# Patient Record
Sex: MALE | Race: WHITE | Employment: OTHER | URBAN - METROPOLITAN AREA
[De-identification: names, ages, dates, MRNs, and addresses within clinical notes are randomized per-mention and may not be internally consistent; named-entity substitution may affect disease eponyms.]

---

## 2022-09-10 ENCOUNTER — APPOINTMENT (OUTPATIENT)
Dept: GENERAL RADIOLOGY | Age: 67
DRG: 871 | End: 2022-09-10
Payer: COMMERCIAL

## 2022-09-10 ENCOUNTER — HOSPITAL ENCOUNTER (INPATIENT)
Age: 67
LOS: 6 days | Discharge: NURSING, REHAB OR LONG-TERM CARE | DRG: 871 | End: 2022-09-16
Attending: STUDENT IN AN ORGANIZED HEALTH CARE EDUCATION/TRAINING PROGRAM | Admitting: INTERNAL MEDICINE
Payer: COMMERCIAL

## 2022-09-10 ENCOUNTER — APPOINTMENT (OUTPATIENT)
Dept: CT IMAGING | Age: 67
DRG: 871 | End: 2022-09-10
Payer: COMMERCIAL

## 2022-09-10 DIAGNOSIS — U07.1 COVID: ICD-10-CM

## 2022-09-10 DIAGNOSIS — E83.42 HYPOMAGNESEMIA: ICD-10-CM

## 2022-09-10 DIAGNOSIS — K70.30 ALCOHOLIC CIRRHOSIS OF LIVER WITHOUT ASCITES (HCC): ICD-10-CM

## 2022-09-10 DIAGNOSIS — B96.29 UTI DUE TO EXTENDED-SPECTRUM BETA LACTAMASE (ESBL) PRODUCING ESCHERICHIA COLI: ICD-10-CM

## 2022-09-10 DIAGNOSIS — Z16.12 UTI DUE TO EXTENDED-SPECTRUM BETA LACTAMASE (ESBL) PRODUCING ESCHERICHIA COLI: ICD-10-CM

## 2022-09-10 DIAGNOSIS — Z16.12 INFECTION DUE TO ESBL-PRODUCING ESCHERICHIA COLI: ICD-10-CM

## 2022-09-10 DIAGNOSIS — A41.9 SEPSIS, DUE TO UNSPECIFIED ORGANISM, UNSPECIFIED WHETHER ACUTE ORGAN DYSFUNCTION PRESENT (HCC): Primary | ICD-10-CM

## 2022-09-10 DIAGNOSIS — N39.0 UTI DUE TO EXTENDED-SPECTRUM BETA LACTAMASE (ESBL) PRODUCING ESCHERICHIA COLI: ICD-10-CM

## 2022-09-10 DIAGNOSIS — A49.8 INFECTION DUE TO ESBL-PRODUCING ESCHERICHIA COLI: ICD-10-CM

## 2022-09-10 PROBLEM — R65.21 SEPTIC SHOCK (HCC): Status: ACTIVE | Noted: 2022-09-10

## 2022-09-10 LAB
A/G RATIO: 0.9 (ref 1.1–2.2)
ALBUMIN SERPL-MCNC: 2.9 G/DL (ref 3.4–5)
ALP BLD-CCNC: 310 U/L (ref 40–129)
ALT SERPL-CCNC: 33 U/L (ref 10–40)
ANION GAP SERPL CALCULATED.3IONS-SCNC: 16 MMOL/L (ref 3–16)
AST SERPL-CCNC: 114 U/L (ref 15–37)
BACTERIA: ABNORMAL /HPF
BASE EXCESS VENOUS: -7.1 MMOL/L (ref -2–3)
BASOPHILS ABSOLUTE: 0.1 K/UL (ref 0–0.2)
BASOPHILS RELATIVE PERCENT: 0.7 %
BILIRUB SERPL-MCNC: 9.4 MG/DL (ref 0–1)
BILIRUBIN URINE: ABNORMAL
BLOOD, URINE: ABNORMAL
BUN BLDV-MCNC: 10 MG/DL (ref 7–20)
CALCIUM SERPL-MCNC: 8.5 MG/DL (ref 8.3–10.6)
CARBOXYHEMOGLOBIN: 2 % (ref 0–1.5)
CHLORIDE BLD-SCNC: 98 MMOL/L (ref 99–110)
CLARITY: CLEAR
CO2: 15 MMOL/L (ref 21–32)
COLOR: YELLOW
CREAT SERPL-MCNC: 0.9 MG/DL (ref 0.8–1.3)
EOSINOPHILS ABSOLUTE: 0 K/UL (ref 0–0.6)
EOSINOPHILS RELATIVE PERCENT: 0.2 %
EPITHELIAL CELLS, UA: ABNORMAL /HPF (ref 0–5)
GFR AFRICAN AMERICAN: >60
GFR NON-AFRICAN AMERICAN: >60
GLUCOSE BLD-MCNC: 122 MG/DL (ref 70–99)
GLUCOSE URINE: NEGATIVE MG/DL
HCO3 VENOUS: 16.3 MMOL/L (ref 24–28)
HCT VFR BLD CALC: 30.3 % (ref 40.5–52.5)
HEMOGLOBIN, VEN, REDUCED: 4.9 %
HEMOGLOBIN: 9.8 G/DL (ref 13.5–17.5)
INFLUENZA A: NOT DETECTED
INFLUENZA B: NOT DETECTED
INR BLD: 1.43 (ref 0.87–1.14)
KETONES, URINE: ABNORMAL MG/DL
LACTIC ACID, SEPSIS: 4.1 MMOL/L (ref 0.4–1.9)
LACTIC ACID: 1.6 MMOL/L (ref 0.4–2)
LACTIC ACID: 2.5 MMOL/L (ref 0.4–2)
LACTIC ACID: 4.1 MMOL/L (ref 0.4–2)
LEUKOCYTE ESTERASE, URINE: ABNORMAL
LYMPHOCYTES ABSOLUTE: 0.3 K/UL (ref 1–5.1)
LYMPHOCYTES RELATIVE PERCENT: 4.2 %
MAGNESIUM: 1.5 MG/DL (ref 1.8–2.4)
MCH RBC QN AUTO: 35 PG (ref 26–34)
MCHC RBC AUTO-ENTMCNC: 32.4 G/DL (ref 31–36)
MCV RBC AUTO: 108.1 FL (ref 80–100)
METHEMOGLOBIN VENOUS: 0 % (ref 0–1.5)
MICROSCOPIC EXAMINATION: YES
MONOCYTES ABSOLUTE: 0.2 K/UL (ref 0–1.3)
MONOCYTES RELATIVE PERCENT: 2.6 %
NEUTROPHILS ABSOLUTE: 7 K/UL (ref 1.7–7.7)
NEUTROPHILS RELATIVE PERCENT: 92.3 %
NITRITE, URINE: POSITIVE
O2 SAT, VEN: 95 %
PCO2, VEN: 25.9 MMHG (ref 41–51)
PDW BLD-RTO: 16.3 % (ref 12.4–15.4)
PH UA: 6 (ref 5–8)
PH VENOUS: 7.41 (ref 7.35–7.45)
PLATELET # BLD: 169 K/UL (ref 135–450)
PMV BLD AUTO: 10.9 FL (ref 5–10.5)
PO2, VEN: 70.7 MMHG (ref 25–40)
POTASSIUM SERPL-SCNC: 4.2 MMOL/L (ref 3.5–5.1)
PRO-BNP: 1452 PG/ML (ref 0–124)
PROTEIN UA: 30 MG/DL
PROTHROMBIN TIME: 17.4 SEC (ref 11.7–14.5)
RBC # BLD: 2.8 M/UL (ref 4.2–5.9)
RBC UA: ABNORMAL /HPF (ref 0–4)
SARS-COV-2 RNA, RT PCR: DETECTED
SODIUM BLD-SCNC: 129 MMOL/L (ref 136–145)
SPECIFIC GRAVITY UA: 1.01 (ref 1–1.03)
TCO2 CALC VENOUS: 17 MMOL/L
TOTAL PROTEIN: 6.3 G/DL (ref 6.4–8.2)
TROPONIN: <0.01 NG/ML
URINE REFLEX TO CULTURE: YES
URINE TYPE: ABNORMAL
UROBILINOGEN, URINE: 1 E.U./DL
WBC # BLD: 7.6 K/UL (ref 4–11)
WBC UA: >100 /HPF (ref 0–5)

## 2022-09-10 PROCEDURE — 80053 COMPREHEN METABOLIC PANEL: CPT

## 2022-09-10 PROCEDURE — 6360000002 HC RX W HCPCS: Performed by: INTERNAL MEDICINE

## 2022-09-10 PROCEDURE — 2060000000 HC ICU INTERMEDIATE R&B

## 2022-09-10 PROCEDURE — 71275 CT ANGIOGRAPHY CHEST: CPT

## 2022-09-10 PROCEDURE — 87150 DNA/RNA AMPLIFIED PROBE: CPT

## 2022-09-10 PROCEDURE — 87086 URINE CULTURE/COLONY COUNT: CPT

## 2022-09-10 PROCEDURE — 83880 ASSAY OF NATRIURETIC PEPTIDE: CPT

## 2022-09-10 PROCEDURE — 36415 COLL VENOUS BLD VENIPUNCTURE: CPT

## 2022-09-10 PROCEDURE — 81001 URINALYSIS AUTO W/SCOPE: CPT

## 2022-09-10 PROCEDURE — 6370000000 HC RX 637 (ALT 250 FOR IP)

## 2022-09-10 PROCEDURE — 74174 CTA ABD&PLVS W/CONTRAST: CPT

## 2022-09-10 PROCEDURE — 6360000002 HC RX W HCPCS: Performed by: STUDENT IN AN ORGANIZED HEALTH CARE EDUCATION/TRAINING PROGRAM

## 2022-09-10 PROCEDURE — 96375 TX/PRO/DX INJ NEW DRUG ADDON: CPT

## 2022-09-10 PROCEDURE — 87636 SARSCOV2 & INF A&B AMP PRB: CPT

## 2022-09-10 PROCEDURE — 2580000003 HC RX 258: Performed by: STUDENT IN AN ORGANIZED HEALTH CARE EDUCATION/TRAINING PROGRAM

## 2022-09-10 PROCEDURE — 85610 PROTHROMBIN TIME: CPT

## 2022-09-10 PROCEDURE — 96360 HYDRATION IV INFUSION INIT: CPT

## 2022-09-10 PROCEDURE — 93005 ELECTROCARDIOGRAM TRACING: CPT | Performed by: STUDENT IN AN ORGANIZED HEALTH CARE EDUCATION/TRAINING PROGRAM

## 2022-09-10 PROCEDURE — 87186 SC STD MICRODIL/AGAR DIL: CPT

## 2022-09-10 PROCEDURE — 2580000003 HC RX 258

## 2022-09-10 PROCEDURE — 71045 X-RAY EXAM CHEST 1 VIEW: CPT

## 2022-09-10 PROCEDURE — 83605 ASSAY OF LACTIC ACID: CPT

## 2022-09-10 PROCEDURE — 96365 THER/PROPH/DIAG IV INF INIT: CPT

## 2022-09-10 PROCEDURE — 83735 ASSAY OF MAGNESIUM: CPT

## 2022-09-10 PROCEDURE — 84484 ASSAY OF TROPONIN QUANT: CPT

## 2022-09-10 PROCEDURE — 6360000004 HC RX CONTRAST MEDICATION: Performed by: STUDENT IN AN ORGANIZED HEALTH CARE EDUCATION/TRAINING PROGRAM

## 2022-09-10 PROCEDURE — 87040 BLOOD CULTURE FOR BACTERIA: CPT

## 2022-09-10 PROCEDURE — 80074 ACUTE HEPATITIS PANEL: CPT

## 2022-09-10 PROCEDURE — 99285 EMERGENCY DEPT VISIT HI MDM: CPT

## 2022-09-10 PROCEDURE — 6360000002 HC RX W HCPCS

## 2022-09-10 PROCEDURE — 82803 BLOOD GASES ANY COMBINATION: CPT

## 2022-09-10 PROCEDURE — 85025 COMPLETE CBC W/AUTO DIFF WBC: CPT

## 2022-09-10 PROCEDURE — 87077 CULTURE AEROBIC IDENTIFY: CPT

## 2022-09-10 RX ORDER — SODIUM CHLORIDE 9 MG/ML
INJECTION, SOLUTION INTRAVENOUS CONTINUOUS
Status: DISCONTINUED | OUTPATIENT
Start: 2022-09-10 | End: 2022-09-12

## 2022-09-10 RX ORDER — ACETAMINOPHEN 325 MG/1
650 TABLET ORAL EVERY 6 HOURS PRN
Status: DISCONTINUED | OUTPATIENT
Start: 2022-09-10 | End: 2022-09-16 | Stop reason: HOSPADM

## 2022-09-10 RX ORDER — SODIUM CHLORIDE, SODIUM LACTATE, POTASSIUM CHLORIDE, CALCIUM CHLORIDE 600; 310; 30; 20 MG/100ML; MG/100ML; MG/100ML; MG/100ML
1000 INJECTION, SOLUTION INTRAVENOUS ONCE
Status: COMPLETED | OUTPATIENT
Start: 2022-09-10 | End: 2022-09-10

## 2022-09-10 RX ORDER — SODIUM CHLORIDE 9 MG/ML
INJECTION, SOLUTION INTRAVENOUS PRN
Status: DISCONTINUED | OUTPATIENT
Start: 2022-09-10 | End: 2022-09-16 | Stop reason: HOSPADM

## 2022-09-10 RX ORDER — ONDANSETRON 4 MG/1
4 TABLET, ORALLY DISINTEGRATING ORAL ONCE
Status: DISCONTINUED | OUTPATIENT
Start: 2022-09-10 | End: 2022-09-10

## 2022-09-10 RX ORDER — TAMSULOSIN HYDROCHLORIDE 0.4 MG/1
0.4 CAPSULE ORAL DAILY
Status: DISCONTINUED | OUTPATIENT
Start: 2022-09-10 | End: 2022-09-16 | Stop reason: HOSPADM

## 2022-09-10 RX ORDER — SODIUM CHLORIDE 0.9 % (FLUSH) 0.9 %
5-40 SYRINGE (ML) INJECTION EVERY 12 HOURS SCHEDULED
Status: DISCONTINUED | OUTPATIENT
Start: 2022-09-10 | End: 2022-09-16 | Stop reason: HOSPADM

## 2022-09-10 RX ORDER — PROCHLORPERAZINE EDISYLATE 5 MG/ML
10 INJECTION INTRAMUSCULAR; INTRAVENOUS EVERY 6 HOURS PRN
Status: DISCONTINUED | OUTPATIENT
Start: 2022-09-10 | End: 2022-09-16 | Stop reason: HOSPADM

## 2022-09-10 RX ORDER — GABAPENTIN 300 MG/1
300 CAPSULE ORAL 2 TIMES DAILY
Status: DISCONTINUED | OUTPATIENT
Start: 2022-09-10 | End: 2022-09-16 | Stop reason: HOSPADM

## 2022-09-10 RX ORDER — ENOXAPARIN SODIUM 100 MG/ML
30 INJECTION SUBCUTANEOUS 2 TIMES DAILY
Status: DISCONTINUED | OUTPATIENT
Start: 2022-09-10 | End: 2022-09-16 | Stop reason: HOSPADM

## 2022-09-10 RX ORDER — ACETAMINOPHEN 650 MG/1
650 SUPPOSITORY RECTAL EVERY 6 HOURS PRN
Status: DISCONTINUED | OUTPATIENT
Start: 2022-09-10 | End: 2022-09-16 | Stop reason: HOSPADM

## 2022-09-10 RX ORDER — SODIUM CHLORIDE 0.9 % (FLUSH) 0.9 %
5-40 SYRINGE (ML) INJECTION PRN
Status: DISCONTINUED | OUTPATIENT
Start: 2022-09-10 | End: 2022-09-16 | Stop reason: HOSPADM

## 2022-09-10 RX ORDER — LACTULOSE 10 G/15ML
20 SOLUTION ORAL 3 TIMES DAILY
Status: DISCONTINUED | OUTPATIENT
Start: 2022-09-10 | End: 2022-09-16 | Stop reason: HOSPADM

## 2022-09-10 RX ORDER — MIDODRINE HYDROCHLORIDE 5 MG/1
5 TABLET ORAL
Status: DISCONTINUED | OUTPATIENT
Start: 2022-09-10 | End: 2022-09-16 | Stop reason: HOSPADM

## 2022-09-10 RX ORDER — PANTOPRAZOLE SODIUM 40 MG/1
40 TABLET, DELAYED RELEASE ORAL
Status: DISCONTINUED | OUTPATIENT
Start: 2022-09-11 | End: 2022-09-16 | Stop reason: HOSPADM

## 2022-09-10 RX ORDER — ONDANSETRON 2 MG/ML
4 INJECTION INTRAMUSCULAR; INTRAVENOUS EVERY 6 HOURS PRN
Status: DISCONTINUED | OUTPATIENT
Start: 2022-09-10 | End: 2022-09-16 | Stop reason: HOSPADM

## 2022-09-10 RX ORDER — DIPHENHYDRAMINE HCL 25 MG
25 TABLET ORAL EVERY 6 HOURS PRN
Status: DISCONTINUED | OUTPATIENT
Start: 2022-09-10 | End: 2022-09-16 | Stop reason: HOSPADM

## 2022-09-10 RX ORDER — MAGNESIUM SULFATE IN WATER 40 MG/ML
2000 INJECTION, SOLUTION INTRAVENOUS ONCE
Status: COMPLETED | OUTPATIENT
Start: 2022-09-10 | End: 2022-09-10

## 2022-09-10 RX ADMIN — GABAPENTIN 300 MG: 300 CAPSULE ORAL at 22:07

## 2022-09-10 RX ADMIN — ONDANSETRON 4 MG: 2 INJECTION INTRAMUSCULAR; INTRAVENOUS at 20:33

## 2022-09-10 RX ADMIN — CEFEPIME 2000 MG: 2 INJECTION, POWDER, FOR SOLUTION INTRAVENOUS at 14:52

## 2022-09-10 RX ADMIN — VANCOMYCIN HYDROCHLORIDE 1000 MG: 10 INJECTION, POWDER, LYOPHILIZED, FOR SOLUTION INTRAVENOUS at 16:43

## 2022-09-10 RX ADMIN — RIFAXIMIN 550 MG: 550 TABLET ORAL at 22:07

## 2022-09-10 RX ADMIN — SODIUM CHLORIDE, POTASSIUM CHLORIDE, SODIUM LACTATE AND CALCIUM CHLORIDE 1000 ML: 600; 310; 30; 20 INJECTION, SOLUTION INTRAVENOUS at 14:00

## 2022-09-10 RX ADMIN — ENOXAPARIN SODIUM 30 MG: 100 INJECTION SUBCUTANEOUS at 22:07

## 2022-09-10 RX ADMIN — ACETAMINOPHEN 650 MG: 325 TABLET, FILM COATED ORAL at 20:09

## 2022-09-10 RX ADMIN — TAMSULOSIN HYDROCHLORIDE 0.4 MG: 0.4 CAPSULE ORAL at 22:11

## 2022-09-10 RX ADMIN — SODIUM CHLORIDE: 9 INJECTION, SOLUTION INTRAVENOUS at 18:16

## 2022-09-10 RX ADMIN — MAGNESIUM SULFATE HEPTAHYDRATE 2000 MG: 40 INJECTION, SOLUTION INTRAVENOUS at 16:59

## 2022-09-10 RX ADMIN — IOPAMIDOL 75 ML: 755 INJECTION, SOLUTION INTRAVENOUS at 14:17

## 2022-09-10 ASSESSMENT — PAIN DESCRIPTION - LOCATION
LOCATION: ABDOMEN
LOCATION: ABDOMEN

## 2022-09-10 ASSESSMENT — ENCOUNTER SYMPTOMS
STRIDOR: 0
CONSTIPATION: 0
VOMITING: 1
TROUBLE SWALLOWING: 0
DIARRHEA: 0
PHOTOPHOBIA: 0
CHOKING: 0
ABDOMINAL PAIN: 1
VOICE CHANGE: 0
ABDOMINAL DISTENTION: 0
CHEST TIGHTNESS: 0
WHEEZING: 0
SHORTNESS OF BREATH: 1
NAUSEA: 1
SORE THROAT: 0
COUGH: 1

## 2022-09-10 ASSESSMENT — PAIN DESCRIPTION - ORIENTATION
ORIENTATION: RIGHT
ORIENTATION: RIGHT

## 2022-09-10 ASSESSMENT — PAIN SCALES - GENERAL
PAINLEVEL_OUTOF10: 0
PAINLEVEL_OUTOF10: 0
PAINLEVEL_OUTOF10: 8

## 2022-09-10 ASSESSMENT — PAIN - FUNCTIONAL ASSESSMENT: PAIN_FUNCTIONAL_ASSESSMENT: 0-10

## 2022-09-10 ASSESSMENT — PAIN DESCRIPTION - DESCRIPTORS: DESCRIPTORS: DISCOMFORT

## 2022-09-10 ASSESSMENT — PAIN DESCRIPTION - ONSET: ONSET: ON-GOING

## 2022-09-10 ASSESSMENT — PAIN DESCRIPTION - FREQUENCY: FREQUENCY: INTERMITTENT

## 2022-09-10 ASSESSMENT — PAIN DESCRIPTION - PAIN TYPE: TYPE: ACUTE PAIN

## 2022-09-10 NOTE — ED TRIAGE NOTES
From ECF with shortness of breath that started 3 days ago. Pt diagnosed with covid on 8/29.  's on arrival

## 2022-09-10 NOTE — ED PROVIDER NOTES
[id number] RESIDENT NOTE       Date of evaluation: 9/10/2022    Chief Complaint     Shortness of Breath (From ECF with shortness of breath that started 3 days ago. Pt diagnosed with covid on 8/29. 's on arrival)      History of Present Illness     Milo Garcia is a 79 y.o. male with PMH of alcoholic liver cirrhosis, GERD, hyponatremia who presents to the ED from [facility] with shortness of breath and chills. Patient reported he has been feeling sick for the past couple of days was diagnosed with COVID on 8/29/2022. He says he endorses generalized weakness has a cough productive of sputum. Reports he has intermittent chest pain which is a pressure-like symptom and at times also feels his heart is racing. He reports endorsing chills but denies having any fevers. He reports endorsing abdominal pain and discomfort denies having any hematemesis/melena. I called the nurse at [facility] who reported the patient was admitted at the facility on 9/2/2022 after he was discharged from the [address] [place] [address].  Patient was admitted to the [address] [place] [address] for hyponatremia and was discharged with lactulose and rifaximin. Review of Systems     Review of Systems   Constitutional:  Positive for activity change, appetite change, chills, diaphoresis, fatigue and unexpected weight change. HENT:  Negative for sore throat, trouble swallowing and voice change. Eyes:  Negative for photophobia. Respiratory:  Positive for cough and shortness of breath. Negative for choking, chest tightness, wheezing and stridor. Cardiovascular:  Positive for palpitations. Negative for chest pain and leg swelling. Gastrointestinal:  Positive for abdominal pain, nausea and vomiting. Negative for abdominal distention, constipation and diarrhea. Genitourinary:  Negative for dysuria. Musculoskeletal:  Negative for myalgias.    Neurological:  Positive for dizziness and light-headedness. Negative for seizures, syncope, facial asymmetry, speech difficulty, weakness, numbness and headaches. Psychiatric/Behavioral:  Negative for agitation and confusion. Past Medical, Surgical, Family, and Social History     He has a past medical history of Alcoholic cirrhosis of liver ([facility] [address] Alcoholic polyneuropathy ([facility] [address] Anemia of chronic disorder, COVID-19, Esophageal reflux, and Essential hypertension, benign. He has no past surgical history on file. His family history is not on file. He reports that he has quit smoking. His smoking use included cigarettes. He has been exposed to tobacco smoke. He has never used smokeless tobacco. He reports that he does not currently use alcohol. He reports that he does not use drugs. Medications     Previous Medications    No medications on file       Allergies     He is allergic to ceftriaxone. Physical Exam     INITIAL VITALS: BP: 115/64, Temp: 98.4 °F (36.9 °C), Heart Rate: (!) 150, Resp: 27, SpO2: 100 %   Physical Exam  Constitutional:       General: He is in acute distress. Appearance: He is obese. He is ill-appearing and toxic-appearing. Comments: Jaundiced appearance    HENT:      Head: Normocephalic and atraumatic. Eyes:      Extraocular Movements: Extraocular movements intact. Pupils: Pupils are equal, round, and reactive to light. Cardiovascular:      Rate and Rhythm: Regular rhythm. Tachycardia present. Pulmonary:      Effort: Tachypnea and respiratory distress present. Breath sounds: No decreased breath sounds, wheezing, rhonchi or rales. Chest:      Chest wall: No mass or tenderness. Abdominal:      Palpations: There is no mass. Tenderness: There is abdominal tenderness (diffuse). Comments: Dilated superficial abdominal veins   Musculoskeletal:      Cervical back: Neck supple. Skin:     General: Skin is warm.       Comments: Jaundiced    Neurological:      General: No focal deficit present. Diagnostic Results     EKG   Interpreted inconjunction with emergency department physician Sirisha Mayorga MD  Rhythm: sinus tachycardia  Rate: tachycardia and 150-160  Axis: normal  Ectopy: none  Conduction: normal  ST Segments: no acute change and nonspecific changes  T Waves: non specific changes  Q Waves: nonspecific  Clinical Impression: sinus tachycardia  Comparison: None available    RADIOLOGY:  CTA ABDOMEN PELVIS W WO CONTRAST   Final Result      No sign of any aneurysm or dissection of the aorta or branches of the aorta      Incidental findings include fatty liver, borderline in size and moderate splenomegaly      Cholelithiasis with trace free fluid in the abdomen along left paracolic gutter of uncertain etiology      Scattered diverticulosis but no sign of any acute or active diverticulitis or obstruction      Bladder wall thickening appreciated. Fat-containing inguinal hernias noted bilaterally      CTA CHEST W WO CONTRAST   Final Result      No sign of any aneurysm or dissection of the aorta or branches of the aorta      Incidental findings include fatty liver, borderline in size and moderate splenomegaly      Cholelithiasis with trace free fluid in the abdomen along left paracolic gutter of uncertain etiology      Scattered diverticulosis but no sign of any acute or active diverticulitis or obstruction      Bladder wall thickening appreciated. Fat-containing inguinal hernias noted bilaterally      XR CHEST PORTABLE   Final Result      No acute radiographic abnormality.                       LABS:   Results for orders placed or performed during the hospital encounter of 09/10/22   COVID-19 & Influenza Combo    Specimen: Nasopharyngeal Swab   Result Value Ref Range    SARS-CoV-2 RNA, RT PCR DETECTED (A) NOT DETECTED    INFLUENZA A NOT DETECTED NOT DETECTED    INFLUENZA B NOT DETECTED NOT DETECTED   CBC with Auto Differential   Result Value Ref Range    WBC 7.6 4.0 - 11.0 K/uL    RBC 2.80 (L) 4.20 - 5.90 M/uL    Hemoglobin 9.8 (L) 13.5 - 17.5 g/dL    Hematocrit 30.3 (L) 40.5 - 52.5 %    .1 (H) 80.0 - 100.0 fL    MCH 35.0 (H) 26.0 - 34.0 pg    MCHC 32.4 31.0 - 36.0 g/dL    RDW 16.3 (H) 12.4 - 15.4 %    Platelets 369 797 - 268 K/uL    MPV 10.9 (H) 5.0 - 10.5 fL    Neutrophils % 92.3 %    Lymphocytes % 4.2 %    Monocytes % 2.6 %    Eosinophils % 0.2 %    Basophils % 0.7 %    Neutrophils Absolute 7.0 1.7 - 7.7 K/uL    Lymphocytes Absolute 0.3 (L) 1.0 - 5.1 K/uL    Monocytes Absolute 0.2 0.0 - 1.3 K/uL    Eosinophils Absolute 0.0 0.0 - 0.6 K/uL    Basophils Absolute 0.1 0.0 - 0.2 K/uL   Comprehensive Metabolic Panel   Result Value Ref Range    Sodium 129 (L) 136 - 145 mmol/L    Potassium 4.2 3.5 - 5.1 mmol/L    Chloride 98 (L) 99 - 110 mmol/L    CO2 15 (L) 21 - 32 mmol/L    Anion Gap 16 3 - 16    Glucose 122 (H) 70 - 99 mg/dL    BUN 10 7 - 20 mg/dL    Creatinine 0.9 0.8 - 1.3 mg/dL    GFR Non-African American >60 >60    GFR African American >60 >60    Calcium 8.5 8.3 - 10.6 mg/dL    Total Protein 6.3 (L) 6.4 - 8.2 g/dL    Albumin 2.9 (L) 3.4 - 5.0 g/dL    Albumin/Globulin Ratio 0.9 (L) 1.1 - 2.2    Total Bilirubin 9.4 (H) 0.0 - 1.0 mg/dL    Alkaline Phosphatase 310 (H) 40 - 129 U/L    ALT 33 10 - 40 U/L     (H) 15 - 37 U/L   Protime-INR   Result Value Ref Range    Protime 17.4 (H) 11.7 - 14.5 sec    INR 1.43 (H) 0.87 - 1.14   Lactic Acid   Result Value Ref Range    Lactic Acid 4.1 (HH) 0.4 - 2.0 mmol/L   Magnesium   Result Value Ref Range    Magnesium 1.50 (L) 1.80 - 2.40 mg/dL   Blood gas, venous (Lab)   Result Value Ref Range    pH, Armando 7.409 7.350 - 7.450    pCO2, Armando 25.9 (L) 41.0 - 51.0 mmHg    pO2, Armando 70.7 (H) 25.0 - 40.0 mmHg    HCO3, Venous 16.3 (L) 24.0 - 28.0 mmol/L    Base Excess, Armando -7.1 (L) -2.0 - 3.0 mmol/L    O2 Sat, Armando 95 Not established %    Carboxyhemoglobin 2.0 (H) 0.0 - 1.5 %    MetHgb, Armando 0.0 0.0 - 1.5 %    TC02 (Calc), Armando 17 mmol/L Hemoglobin, Armando, Reduced 4.90 %   Troponin   Result Value Ref Range    Troponin <0.01 <0.01 ng/mL   Brain Natriuretic Peptide   Result Value Ref Range    Pro-BNP 1,452 (H) 0 - 124 pg/mL       ED BEDSIDE ULTRASOUND:  No results found. RECENT VITALS:  BP: (!) 97/58, Temp: 98.4 °F (36.9 °C),Heart Rate: (!) 111, Resp: 25, SpO2: 98 %     Procedures     Bedside echo    ED Course     Nursing Notes, Past Medical Hx, Past Surgical Hx, Social Hx, Allergies, and FamilyHx were reviewed. The patient was giventhe following medications:  Orders Placed This Encounter   Medications    lactated ringers infusion 1,000 mL    vancomycin (VANCOCIN) 1,000 mg in dextrose 5 % 250 mL IVPB     Order Specific Question:   Antimicrobial Indications     Answer:   Sepsis of Unknown Etiology     Order Specific Question:   Antimicrobial Indications     Answer:   Intra-Abdominal Infection    cefepime (MAXIPIME) 2000 mg IVPB minibag     Order Specific Question:   Antimicrobial Indications     Answer:   Intra-Abdominal Infection     Order Specific Question:   Antimicrobial Indications     Answer:   Sepsis of Unknown Etiology    iopamidol (ISOVUE-370) 76 % injection 75 mL    magnesium sulfate 2000 mg in 50 mL IVPB premix       CONSULTS:  IP CONSULT TO CRITICAL CARE  IP CONSULT TO HOSPITALIST  [place]Y [place] [id number] [place] Aguayo / ROBERT / Claudia Izabela is a 79 y.o. male with PMH of alcoholic liver cirrhosis, GERD, HTN presented to the ED from [facility] with shortness of breath and chills. On initial evaluation patient appeared severely ill, jaundiced appearance, in respiratory distress and using his accessory muscles of respiration was on 2 L of oxygen but satting well at 99%. Patient was tachycardic with heart rate in 150s to 160s. EKG at bedside reveals sinus tachycardia. I ordered 1 L LR bolus, CXR was nonrevealing. VBG revealed pH of 7.409.   Lactic acid 4.1, any 129, mag 1.5, K+ 4.2, ALT 33, , total bilirubin 9.4, albumin 2.9, INR 1.43, WBC 7.6, Hb 9.8. I ordered repeat lactic acid which is pending. I given a dose of vancomycin and cefepime. I replace magnesium with 2 g IV. Patient's COVID-19 result came back positive. CT abdomen pelvis with and without contrast was nonrevealing. CTA chest with without contrast was nonrevealing as well. Patient's blood pressure was stable at 100s/60s with MAP >65. I talked with ICU attending who said patient's blood pressures been labile but controlled and is more PCU appropriate. Patient to be admitted in PCU with the hospitalist.    This patient was also evaluated by the attending physician. All care plans were discussed and agreed upon. Clinical Impression     1. Sepsis, due to unspecified organism, unspecified whether acute organ dysfunction present ([facility] [address]    2. Alcoholic cirrhosis of liver without ascites ([facility] [address]    3. Hypomagnesemia    4. COVID        Disposition     PATIENT REFERRED TO:  No follow-up provider specified.     DISCHARGE MEDICATIONS:  New Prescriptions    No medications on file       DISPOSITION Admitted 09/10/2022 04:57:39 PM       Lilly Marrufo MD  Resident  09/10/22 4891

## 2022-09-10 NOTE — CONSULTS
Clinical Pharmacy Progress Note    Vancomycin - Management by Pharmacy    Consult Date(s): 9/10/22  Consulting Provider(s): Jannie    Assessment / Plan    Sepsis - Vancomycin  Concurrent Antimicrobials: cefepime  Day of Vanc Therapy / Ordered Duration: 1/7  Current Dosing Method: Bayesian-Guided AUC Dosing  Therapeutic Goal: 400-600 mg/L*hr  Current Dose / Frequency: 1000 mg every 12 hours  Plan / Rationale:   Dose predicts steady state AUC of 559 mg/L*hr with accompanying trough of 16.8 mcg/mL. Random level ordered with AM labs tomorrow to assess kinetics. Will continue to monitor clinical condition and make adjustments to regimen as appropriate. Please call with any questions. Fredi Copeland, PharmD, BCPS  Main pharmacy: [id number]  9/10/2022 5:56 PM        Interval update:     Subjective/Objective: Mr. Toña Cadena is a 79 y.o. male with a PMHx significant for alcoholic cirrhosis, presented from [facility] with SOB x3 days. Patient was diagnosed with COVID on 8/29. Patient admitted for sepsis 2/2 COVID-19 infection. Pharmacy has been consulted to dose vancomycin. Ht Readings from Last 1 Encounters:   09/10/22 5' 9\" (1.753 m)     Wt Readings from Last 1 Encounters:   09/10/22 227 lb 6.4 oz (103.1 kg)       Current & Prior Antimicrobial Regimen(s):  Cefepime  Vancomycin     Level(s) / Doses:    Date Time Dose Level / Type of Level Interpretation                 Note: Serum levels collected for AUC-based dosing may be high if collected in close proximity to the dose administered. This is not necessarily indicative of toxicity. Cultures & Sensitivities:    Date Site Micro Susceptibility / Result                 Labs / Ancillary Data:    Estimated Creatinine Clearance: 94 mL/min (based on SCr of 0.9 mg/dL). Recent Labs     09/10/22  1335   CREATININE 0.9   BUN 10   WBC 7.6       Additional Lab Values / Findings of Note:    Procalcitonin: No results for input(s): PROCAL in the last 72 hours.

## 2022-09-10 NOTE — PROGRESS NOTES
4 Eyes Admission Assessment     I agree as the admission nurse that 2 RN's have performed a thorough Head to Toe Skin Assessment on the patient. ALL assessment sites listed below have been assessed on admission. Areas assessed by both nurses:   [x]   Head, Face, and Ears   [x]   Shoulders, Back, and Chest  [x]   Arms, Elbows, and Hands   [x]   Coccyx, Sacrum, and Ischium  [x]   Legs, Feet, and Heels        Does the Patient have Skin Breakdown?   Yes a wound was noted on the Admission Assessment and an LDA was Initiated documentation include the Niurka-wound, Wound Assessment, Measurements, Dressing Treatment, Drainage, and Color\",         Jeb Prevention initiated:  Yes   Wound Care Orders initiated:  Yes      WOC nurse consulted for Pressure Injury (Stage 3,4, Unstageable, DTI, NWPT, and Complex wounds) or Jeb score 18 or lower:  Yes      Nurse 1 eSignature: Electronically signed by Ольга Franco RN on 9/10/22 at 6:44 PM EDT    **SHARE this note so that the co-signing nurse is able to place an eSignature**    Nurse 2 eSignature: Electronically signed by Fly Tolliver RN on 9/10/22 at 6:14 PM EDT

## 2022-09-10 NOTE — ED PROVIDER NOTES
ED Attending Attestation Note     Date of evaluation: 9/10/2022    This patient was seen by the resident. I have seen and examined the patient, agree with the workup, evaluation, management and diagnosis. The care plan has been discussed. I have reviewed the ECG and concur with the resident's interpretation. My assessment reveals acutely ill-appearing 71-year-old male presenting with shortness of breath. On arrival, he was tachycardic to the 150s and tachypneic to the high 20s, but was normotensive, afebrile, and had a a room air SpO2 of 100%. He appeared jaundiced and his abdomen was distended. Initial labs were notable for anemia with a hemoglobin of 9.8, per natremia with a sodium of 129, a compensated metabolic acidosis with a pH of 74, PCO2 of 25.9, and HCO3 of 16.3. Lactate was 1.6.  TBili was significantly elevated at 9.4, and alk phos and AST were both elevated as well. INR was 1.43. Given patient's tachycardia, I performed a bedside echo which was significantly limited by poor windows, however did appear to show a mixed density pericardial fluid collection concerning for clotted blood. I was concerned for aortic dissection, and although chest x-ray did not show obvious mediastinal widening, a CTA abdomen pelvis was obtained. This did not show evidence of an aortic dissection nor did it demonstrate a pericardial effusion. I also had concern for sepsis as an underlying etiology of patient's illness, and he was empirically covered with cefepime and vancomycin. His COVID test subsequently returned positive, though he previously had a positive test on 8/29. Urinalysis is pending. Patient will be admitted to the ICU vs. PCU for further management. Critical Care:  Due to the immediate potential for life-threatening deterioration due to suspected sepsis and decompensated liver failure, I spent 45 minutes providing critical care.   This time excludes time spent performing procedures but includes time spent on direct patient care, history retrieval, review of the chart, and discussions with patient, family, and consultant(s).        Philomena Collins MD  09/10/22 5040

## 2022-09-10 NOTE — ED NOTES
Brief Progress Note    The patient Jesi Teague is acutely and critically ill and requires cross-sectional imaging with intravenous contrast. The potential risk of clinically significant kidney injury as a consequence of contrast, rather than their underlying etiology, is outweighed by the very real potential benefit of discovering an actionable abnormality. According to recent literature co-authored by the [employer] and the [employer] (PMID: 29990504), the risk of DANIEL in patients with baseline eGFR of <30mL/min/1.73m2 is 0-17%, and less than 2% in patients with higher eGFR. My clinical judgment suggests that Jesi Teague is at higher risk of harm from delaying his diagnosis and subsequent therapy.      Mellisa Jimenes MD  2:05 PM       Mellisa Jimenes MD  09/10/22 9929

## 2022-09-10 NOTE — H&P
Internal Medicine  PGY 1  History & Physical      CC Shortness of breath    History Obtained From:  patient, electronic medical record    HISTORY OF PRESENT ILLNESS:    Fadia Rios is a 79 y.o. male with PMH of alcoholic liver cirrhosis, GERD, hyponatremia who presents to the ED from [facility] with shortness of breath and chills. Diagnosed with COVID on 8/29/2022, but did not feel sick until the past few days. He endorses generalized weakness, reports that his skin has become increasingly jaundiced during this time, and his urine has become dark in color. He reports endorsing chills associated with shaking but denies having any fevers. He reports endorsing abdominal pain and discomfort denies having any hematemesis/melena. Reports he has intermittent chest pain which is a pressure-like symptom and at times also feels his heart is racing. Also reports that his urine stream used to be stronger, now it \"dribbles out slowly. \" ED called the nurse at [facility] who reported the patient was admitted at the facility on 9/2/2022 after he was discharged from the [facility]. Patient was admitted to the [facility] for weakness, hyponatremia and was discharged with lactulose and rifaximin. Patient reports last drink of alcohol was ~5 months ago. In the ED patient was tachycardic, tachypneic, and mildly hypotensive to 97/58. Received 1L bolus fluids and was started on vancomycin and cefepime. Was also started on 2L NC d/t respiratory effort. BMP significant for Na 129, CO2 15, Mg 1.5, LA 4.1. Hepatic panel w alp phos 310, ALT 33, , bilirubin 9.4. WBC 7.6, Hgb 9.8, plt 169. INR 1.43. VBG w pCO2 25.9, pO2 70.7. CT chest/abd/pel significant for fatty liver, splenomegaly, bladder wall thickening. UA pending. Hepatitis panel pending. Unable to access [address] [place] [address] records at this time.     Past Medical History:        Diagnosis Date    Alcoholic cirrhosis of liver (HCC)     Alcoholic polyneuropathy ([facility] [address]     Anemia of chronic disorder     COVID-19     Esophageal reflux     Essential hypertension, benign        Past Surgical History:    History reviewed. No pertinent surgical history. Medications Priorto Admission:    No medications prior to admission. Allergies:  Ceftriaxone    Social History:   TOBACCO:   reports that he has quit smoking. His smoking use included cigarettes. He has been exposed to tobacco smoke. He has never used smokeless tobacco.  ETOH:   reports that he does not currently use alcohol. DRUGS : denies  Patient currently lives in an assisted living environment    Family History:   History reviewed. No pertinent family history. Review of Systems    ROS: A 10 point review of systems was conducted, significant findings as noted in HPI. Physical Exam  Constitutional:       Appearance: He is obese. He is ill-appearing. HENT:      Head: Normocephalic and atraumatic. Mouth/Throat:      Mouth: Mucous membranes are dry. Pharynx: No oropharyngeal exudate. Eyes:      Extraocular Movements: Extraocular movements intact. Conjunctiva/sclera: Conjunctivae normal.   Cardiovascular:      Rate and Rhythm: Regular rhythm. Tachycardia present. Pulses: Normal pulses. Pulmonary:      Effort: Pulmonary effort is normal.      Breath sounds: Normal breath sounds. Abdominal:      General: Bowel sounds are normal. There is distension. Tenderness: There is abdominal tenderness. Comments: Generalized tenderness to light palpation, most tender over RUQ   Skin:     General: Skin is warm and dry. Coloration: Skin is jaundiced. Comments: Caput medusae present. Bilateral LE w nontender dark/red discoloration that patient endorses has been that way for a long time   Neurological:      Mental Status: He is alert.      Physical exam:       Vitals:    09/10/22 1700   BP: 114/68   Pulse: (!) 125   Resp: 19   Temp:    SpO2: 98%       DATA:    Labs:  CBC: Recent Labs     09/10/22  1335   WBC 7.6   HGB 9.8*   HCT 30.3*          BMP:   Recent Labs     09/10/22  1335   *   K 4.2   CL 98*   CO2 15*   BUN 10   CREATININE 0.9   GLUCOSE 122*     LFT's:   Recent Labs     09/10/22  1335   *   ALT 33   BILITOT 9.4*   ALKPHOS 310*     Troponin:   Recent Labs     09/10/22  1335   TROPONINI <0.01     BNP:No results for input(s): BNP in the last 72 hours. ABGs: No results for input(s): PHART, DIY8IQB, PO2ART in the last 72 hours. INR:   Recent Labs     09/10/22  1335   INR 1.43*       U/A:No results for input(s): NITRITE, COLORU, PHUR, LABCAST, WBCUA, RBCUA, MUCUS, TRICHOMONAS, YEAST, BACTERIA, CLARITYU, SPECGRAV, LEUKOCYTESUR, UROBILINOGEN, BILIRUBINUR, BLOODU, GLUCOSEU, AMORPHOUS in the last 72 hours. Invalid input(s): KETONESU    CTA ABDOMEN PELVIS W WO CONTRAST   Final Result      No sign of any aneurysm or dissection of the aorta or branches of the aorta      Incidental findings include fatty liver, borderline in size and moderate splenomegaly      Cholelithiasis with trace free fluid in the abdomen along left paracolic gutter of uncertain etiology      Scattered diverticulosis but no sign of any acute or active diverticulitis or obstruction      Bladder wall thickening appreciated. Fat-containing inguinal hernias noted bilaterally      CTA CHEST W WO CONTRAST   Final Result      No sign of any aneurysm or dissection of the aorta or branches of the aorta      Incidental findings include fatty liver, borderline in size and moderate splenomegaly      Cholelithiasis with trace free fluid in the abdomen along left paracolic gutter of uncertain etiology      Scattered diverticulosis but no sign of any acute or active diverticulitis or obstruction      Bladder wall thickening appreciated. Fat-containing inguinal hernias noted bilaterally      XR CHEST PORTABLE   Final Result      No acute radiographic abnormality. ASSESSMENT AND PLAN:    César Ballard is a 79 y.o. male with PMH of alcoholic liver cirrhosis, GERD, hyponatremia who presents to the ED from [address] [facility] with shortness of breath and chills. Sepsis of unknown origin  COVID-19  Patient came in to ED with tachycardia, tachypnea, CO2 15, LA 4.1. Responded well to 1L bolus. LA now 1.6. WBC normal. CXR/CT chest/abd/pel unremarkable for infectious etiology except for bladder wall thickening. Procal pending. UA pending.   - Continue vanc/cefepime  - Continue maintenance fluids @ 75mL/hr  - Follow UA  - Satting  on 2L NC, wean as tolerated    Acute decompensated alcoholic liver cirrhosis (MELD-Na of 25 - 14-15% 90 day mortality)  Hx of alcoholic cirrhosis per patient and per nurse at assisted living. Patient is jaundiced and has caput medusae, as well as fatty liver and splenomegaly on imaging. Unable to access [facility] records at this time. Patient reports that he does not have a liver doctor or GI doctor, only a PCP. Current MELD is 25.  Last drink 5 months ago per patient.  - Decompensation likely given increase in jaundice/dark color in urine, tho unknown baseline liver fxn labs  - Hgb 9.8, no reported hematemesis, melena, or bright red blood in stool  - Continue home rifaximin and lactulose  - Continue home 5 mg midodrine Q8H for SBP <100  - GI consult    Chronic Conditions  Alcoholic polyneuropathy  - Continue home gabapentin 300 mg BID    GERD  - Continue home protonix 40 mg daily    HTN  Mild hypotension on admission  - Hold home metoprolol succinate 12.5 mg daily, restart if pressures remain normal or elevated    BPH/Urinary retention  - Continue home tamsulosin 0.4 mg QHS    Will discuss with attending physician Dr. Jaida Rosales    Code Status:Full code  FEN: Regular - low Na  PPX: Lovenox  DISPO: Liz Ruiz MD  9/10/2022,  5:50 PM    Attending Supervising Physicians Attestation Statement  I have seen, examined and evaluated the patient as did the resident physician on 9/10/2022. We have discussed the plan of care and decisions made during that discussion were incorporated into this note. I have reviewed the resident physician's note and agree with the assessment and plan of care as documented.      I certify that Toña Cadena is expected to be hospitalized for >2 midnights based on the following assessment and plan:     Leonardo Philip MD

## 2022-09-11 LAB
ALBUMIN SERPL-MCNC: 2.2 G/DL (ref 3.4–5)
ALP BLD-CCNC: 218 U/L (ref 40–129)
ALT SERPL-CCNC: 23 U/L (ref 10–40)
ANION GAP SERPL CALCULATED.3IONS-SCNC: 12 MMOL/L (ref 3–16)
ANION GAP SERPL CALCULATED.3IONS-SCNC: 8 MMOL/L (ref 3–16)
AST SERPL-CCNC: 68 U/L (ref 15–37)
BASOPHILS ABSOLUTE: 0.1 K/UL (ref 0–0.2)
BASOPHILS RELATIVE PERCENT: 1 %
BILIRUB SERPL-MCNC: 6.2 MG/DL (ref 0–1)
BILIRUBIN DIRECT: 4.4 MG/DL (ref 0–0.3)
BILIRUBIN, INDIRECT: 1.8 MG/DL (ref 0–1)
BUN BLDV-MCNC: 12 MG/DL (ref 7–20)
BUN BLDV-MCNC: 14 MG/DL (ref 7–20)
CALCIUM SERPL-MCNC: 7.4 MG/DL (ref 8.3–10.6)
CALCIUM SERPL-MCNC: 7.6 MG/DL (ref 8.3–10.6)
CHLORIDE BLD-SCNC: 101 MMOL/L (ref 99–110)
CHLORIDE BLD-SCNC: 99 MMOL/L (ref 99–110)
CO2: 18 MMOL/L (ref 21–32)
CO2: 19 MMOL/L (ref 21–32)
CREAT SERPL-MCNC: 0.8 MG/DL (ref 0.8–1.3)
CREAT SERPL-MCNC: 0.9 MG/DL (ref 0.8–1.3)
EKG ATRIAL RATE: 150 BPM
EKG DIAGNOSIS: NORMAL
EKG P AXIS: 28 DEGREES
EKG P-R INTERVAL: 128 MS
EKG Q-T INTERVAL: 324 MS
EKG QRS DURATION: 70 MS
EKG QTC CALCULATION (BAZETT): 511 MS
EKG R AXIS: 24 DEGREES
EKG T AXIS: 78 DEGREES
EKG VENTRICULAR RATE: 150 BPM
EOSINOPHILS ABSOLUTE: 0 K/UL (ref 0–0.6)
EOSINOPHILS RELATIVE PERCENT: 0.1 %
GFR AFRICAN AMERICAN: >60
GFR AFRICAN AMERICAN: >60
GFR NON-AFRICAN AMERICAN: >60
GFR NON-AFRICAN AMERICAN: >60
GLUCOSE BLD-MCNC: 114 MG/DL (ref 70–99)
GLUCOSE BLD-MCNC: 178 MG/DL (ref 70–99)
HAV IGM SER IA-ACNC: NORMAL
HCT VFR BLD CALC: 22.9 % (ref 40.5–52.5)
HEMOGLOBIN: 7.9 G/DL (ref 13.5–17.5)
HEPATITIS B CORE IGM ANTIBODY: NORMAL
HEPATITIS B SURFACE ANTIGEN INTERPRETATION: NORMAL
HEPATITIS C ANTIBODY INTERPRETATION: NORMAL
LACTIC ACID: 1.8 MMOL/L (ref 0.4–2)
LYMPHOCYTES ABSOLUTE: 0.6 K/UL (ref 1–5.1)
LYMPHOCYTES RELATIVE PERCENT: 8.3 %
MCH RBC QN AUTO: 36 PG (ref 26–34)
MCHC RBC AUTO-ENTMCNC: 34.6 G/DL (ref 31–36)
MCV RBC AUTO: 103.9 FL (ref 80–100)
MONOCYTES ABSOLUTE: 0.6 K/UL (ref 0–1.3)
MONOCYTES RELATIVE PERCENT: 7.8 %
NEUTROPHILS ABSOLUTE: 6.3 K/UL (ref 1.7–7.7)
NEUTROPHILS RELATIVE PERCENT: 82.8 %
PDW BLD-RTO: 15.7 % (ref 12.4–15.4)
PLATELET # BLD: 86 K/UL (ref 135–450)
PLATELET SLIDE REVIEW: ABNORMAL
PMV BLD AUTO: 10.5 FL (ref 5–10.5)
POTASSIUM REFLEX MAGNESIUM: 3.6 MMOL/L (ref 3.5–5.1)
POTASSIUM REFLEX MAGNESIUM: 4.1 MMOL/L (ref 3.5–5.1)
RBC # BLD: 2.2 M/UL (ref 4.2–5.9)
REPORT: NORMAL
SLIDE REVIEW: ABNORMAL
SODIUM BLD-SCNC: 126 MMOL/L (ref 136–145)
SODIUM BLD-SCNC: 131 MMOL/L (ref 136–145)
TOTAL PROTEIN: 4.6 G/DL (ref 6.4–8.2)
VANCOMYCIN RANDOM: 6.4 UG/ML
WBC # BLD: 7.6 K/UL (ref 4–11)

## 2022-09-11 PROCEDURE — 80048 BASIC METABOLIC PNL TOTAL CA: CPT

## 2022-09-11 PROCEDURE — 80202 ASSAY OF VANCOMYCIN: CPT

## 2022-09-11 PROCEDURE — 1200000000 HC SEMI PRIVATE

## 2022-09-11 PROCEDURE — 36415 COLL VENOUS BLD VENIPUNCTURE: CPT

## 2022-09-11 PROCEDURE — 6360000002 HC RX W HCPCS

## 2022-09-11 PROCEDURE — 2060000000 HC ICU INTERMEDIATE R&B

## 2022-09-11 PROCEDURE — 2580000003 HC RX 258

## 2022-09-11 PROCEDURE — 2580000003 HC RX 258: Performed by: INTERNAL MEDICINE

## 2022-09-11 PROCEDURE — 6370000000 HC RX 637 (ALT 250 FOR IP)

## 2022-09-11 PROCEDURE — 83605 ASSAY OF LACTIC ACID: CPT

## 2022-09-11 PROCEDURE — 85025 COMPLETE CBC W/AUTO DIFF WBC: CPT

## 2022-09-11 PROCEDURE — 80076 HEPATIC FUNCTION PANEL: CPT

## 2022-09-11 PROCEDURE — 6360000002 HC RX W HCPCS: Performed by: INTERNAL MEDICINE

## 2022-09-11 RX ADMIN — TAMSULOSIN HYDROCHLORIDE 0.4 MG: 0.4 CAPSULE ORAL at 09:04

## 2022-09-11 RX ADMIN — VANCOMYCIN HYDROCHLORIDE 1000 MG: 10 INJECTION, POWDER, LYOPHILIZED, FOR SOLUTION INTRAVENOUS at 21:19

## 2022-09-11 RX ADMIN — MEROPENEM 1000 MG: 1 INJECTION, POWDER, FOR SOLUTION INTRAVENOUS at 09:40

## 2022-09-11 RX ADMIN — GABAPENTIN 300 MG: 300 CAPSULE ORAL at 20:59

## 2022-09-11 RX ADMIN — DIPHENHYDRAMINE HYDROCHLORIDE 25 MG: 25 TABLET ORAL at 18:42

## 2022-09-11 RX ADMIN — LACTULOSE 20 G: 20 SOLUTION ORAL at 13:39

## 2022-09-11 RX ADMIN — SODIUM CHLORIDE, PRESERVATIVE FREE 10 ML: 5 INJECTION INTRAVENOUS at 20:58

## 2022-09-11 RX ADMIN — ENOXAPARIN SODIUM 30 MG: 100 INJECTION SUBCUTANEOUS at 09:03

## 2022-09-11 RX ADMIN — ACETAMINOPHEN 650 MG: 325 TABLET, FILM COATED ORAL at 18:42

## 2022-09-11 RX ADMIN — MEROPENEM 1000 MG: 1 INJECTION, POWDER, FOR SOLUTION INTRAVENOUS at 18:44

## 2022-09-11 RX ADMIN — RIFAXIMIN 550 MG: 550 TABLET ORAL at 09:05

## 2022-09-11 RX ADMIN — ACETAMINOPHEN 650 MG: 325 TABLET, FILM COATED ORAL at 09:03

## 2022-09-11 RX ADMIN — CEFEPIME 2000 MG: 2 INJECTION, POWDER, FOR SOLUTION INTRAVENOUS at 01:03

## 2022-09-11 RX ADMIN — SODIUM CHLORIDE: 9 INJECTION, SOLUTION INTRAVENOUS at 06:22

## 2022-09-11 RX ADMIN — LACTULOSE 20 G: 20 SOLUTION ORAL at 09:03

## 2022-09-11 RX ADMIN — CEFEPIME 2000 MG: 2 INJECTION, POWDER, FOR SOLUTION INTRAVENOUS at 09:11

## 2022-09-11 RX ADMIN — PANTOPRAZOLE SODIUM 40 MG: 40 TABLET, DELAYED RELEASE ORAL at 06:18

## 2022-09-11 RX ADMIN — MIDODRINE HYDROCHLORIDE 5 MG: 5 TABLET ORAL at 13:39

## 2022-09-11 RX ADMIN — SODIUM CHLORIDE: 9 INJECTION, SOLUTION INTRAVENOUS at 21:03

## 2022-09-11 RX ADMIN — DIPHENHYDRAMINE HYDROCHLORIDE 25 MG: 25 TABLET ORAL at 09:05

## 2022-09-11 RX ADMIN — GABAPENTIN 300 MG: 300 CAPSULE ORAL at 09:05

## 2022-09-11 RX ADMIN — SODIUM CHLORIDE, PRESERVATIVE FREE 10 ML: 5 INJECTION INTRAVENOUS at 09:17

## 2022-09-11 RX ADMIN — VANCOMYCIN HYDROCHLORIDE 1000 MG: 10 INJECTION, POWDER, LYOPHILIZED, FOR SOLUTION INTRAVENOUS at 06:18

## 2022-09-11 RX ADMIN — RIFAXIMIN 550 MG: 550 TABLET ORAL at 20:59

## 2022-09-11 ASSESSMENT — PAIN DESCRIPTION - DESCRIPTORS
DESCRIPTORS: ACHING

## 2022-09-11 ASSESSMENT — PAIN DESCRIPTION - LOCATION
LOCATION: LEG
LOCATION: HEAD

## 2022-09-11 ASSESSMENT — ENCOUNTER SYMPTOMS
SHORTNESS OF BREATH: 1
VOMITING: 0
NAUSEA: 0
ABDOMINAL PAIN: 0
COUGH: 1
DIARRHEA: 0

## 2022-09-11 ASSESSMENT — PAIN DESCRIPTION - ORIENTATION: ORIENTATION: RIGHT

## 2022-09-11 ASSESSMENT — PAIN DESCRIPTION - ONSET: ONSET: ON-GOING

## 2022-09-11 ASSESSMENT — PAIN SCALES - GENERAL
PAINLEVEL_OUTOF10: 0
PAINLEVEL_OUTOF10: 3
PAINLEVEL_OUTOF10: 3
PAINLEVEL_OUTOF10: 0
PAINLEVEL_OUTOF10: 3

## 2022-09-11 ASSESSMENT — PAIN DESCRIPTION - PAIN TYPE
TYPE: ACUTE PAIN
TYPE: ACUTE PAIN

## 2022-09-11 ASSESSMENT — PAIN DESCRIPTION - FREQUENCY: FREQUENCY: INTERMITTENT

## 2022-09-11 NOTE — PROGRESS NOTES
Department of Pharmacy    Notification received from laboratory of positive blood culture results. Organism(s) detected: E coli  Applicable Antimicrobial Resistance Genes: ESBL    Recommendation changing empiric antibiotics to: meropenem    Recommendations reviewed with Dr. Esteban Quevedo via [facility] message. Please call with any questions.   Fredi Copeland, PharmD, BCPS  Main pharmacy: [id number]  9/11/2022 9:11 AM

## 2022-09-11 NOTE — PROGRESS NOTES
Progress Note    Admit Date: 9/10/2022  Day: 2  Diet: ADULT DIET; Regular; Low Sodium (2 gm)    CC: SOB    Interval history: NAEON. Pt endorses SOB, cough and polyuria during interview and assessment. Medications:     Scheduled Meds:   meropenem  1,000 mg IntraVENous Q8H    sodium chloride flush  5-40 mL IntraVENous 2 times per day    enoxaparin  30 mg SubCUTAneous BID    vancomycin  1,000 mg IntraVENous Q12H    gabapentin  300 mg Oral BID    lactulose  20 g Oral TID    midodrine  5 mg Oral TID WC    pantoprazole  40 mg Oral QAM AC    tamsulosin  0.4 mg Oral Daily    rifAXIMin  550 mg Oral BID     Continuous Infusions:   sodium chloride      sodium chloride 75 mL/hr at 09/11/22 0622     PRN Meds:sodium chloride flush, sodium chloride, acetaminophen **OR** acetaminophen, diphenhydrAMINE, ondansetron, prochlorperazine    Objective:   Vitals:   T-max:  Patient Vitals for the past 8 hrs:   BP Temp Temp src Pulse Resp SpO2   09/11/22 1337 96/61 97.7 °F (36.5 °C) Oral 79 17 98 %   09/11/22 0901 110/63 -- -- 76 -- --   09/11/22 0734 99/63 98 °F (36.7 °C) Oral 80 17 99 %       Intake/Output Summary (Last 24 hours) at 9/11/2022 1355  Last data filed at 9/10/2022 1819  Gross per 24 hour   Intake 1356.71 ml   Output 175 ml   Net 1181.71 ml       Review of Systems   Constitutional:  Positive for chills. Negative for fever. HENT:  Negative for congestion. Respiratory:  Positive for cough and shortness of breath. Cardiovascular:  Negative for chest pain and palpitations. Gastrointestinal:  Negative for abdominal pain, diarrhea, nausea and vomiting. Endocrine: Positive for polyuria. Genitourinary:  Negative for dysuria. Musculoskeletal:  Negative for arthralgias and myalgias. Neurological:  Negative for headaches. Physical Exam  Constitutional:       General: He is not in acute distress. Appearance: Normal appearance. He is obese. He is not ill-appearing or diaphoretic.    HENT:      Head: Normocephalic and atraumatic. Eyes:      Extraocular Movements: Extraocular movements intact. Cardiovascular:      Rate and Rhythm: Normal rate and regular rhythm. Heart sounds: No murmur heard. No friction rub. No gallop. Pulmonary:      Breath sounds: No wheezing, rhonchi or rales. Abdominal:      General: There is distension. Tenderness: There is no abdominal tenderness. There is no guarding or rebound. Musculoskeletal:      Right lower leg: No edema. Left lower leg: No edema. Skin:     General: Skin is warm. Findings: Lesion and rash present. Comments: Multiple scratches on abdomen. Multiple scratches on RLE   Neurological:      Mental Status: He is alert. Mental status is at baseline. LABS:    CBC:   Recent Labs     09/10/22  1335 09/11/22  0421   WBC 7.6 7.6   HGB 9.8* 7.9*   HCT 30.3* 22.9*    86*   .1* 103.9*     Renal:    Recent Labs     09/10/22  1335 09/11/22  0421   * 126*   K 4.2 4.1   CL 98* 99   CO2 15* 19*   BUN 10 12   CREATININE 0.9 0.9   GLUCOSE 122* 114*   CALCIUM 8.5 7.6*   MG 1.50*  --    ANIONGAP 16 8     Hepatic:   Recent Labs     09/10/22  1335   *   ALT 33   BILITOT 9.4*   PROT 6.3*   LABALBU 2.9*   ALKPHOS 310*     Troponin:   Recent Labs     09/10/22  1335   TROPONINI <0.01     BNP: No results for input(s): BNP in the last 72 hours. Lipids: No results for input(s): CHOL, HDL in the last 72 hours. Invalid input(s): LDLCALCU, TRIGLYCERIDE  ABGs:  No results for input(s): PHART, NMY6YIW, PO2ART, DSS7YSL, BEART, THGBART, T2AQGRON, SEL6QAZ in the last 72 hours. INR:   Recent Labs     09/10/22  1335   INR 1.43*     Lactate: No results for input(s): LACTATE in the last 72 hours.   Cultures:  -----------------------------------------------------------------  RAD:   CTA ABDOMEN PELVIS W WO CONTRAST   Final Result      No sign of any aneurysm or dissection of the aorta or branches of the aorta      Incidental findings include fatty liver, borderline in size and moderate splenomegaly      Cholelithiasis with trace free fluid in the abdomen along left paracolic gutter of uncertain etiology      Scattered diverticulosis but no sign of any acute or active diverticulitis or obstruction      Bladder wall thickening appreciated. Fat-containing inguinal hernias noted bilaterally      CTA CHEST W WO CONTRAST   Final Result      No sign of any aneurysm or dissection of the aorta or branches of the aorta      Incidental findings include fatty liver, borderline in size and moderate splenomegaly      Cholelithiasis with trace free fluid in the abdomen along left paracolic gutter of uncertain etiology      Scattered diverticulosis but no sign of any acute or active diverticulitis or obstruction      Bladder wall thickening appreciated. Fat-containing inguinal hernias noted bilaterally      XR CHEST PORTABLE   Final Result      No acute radiographic abnormality. Assessment/Plan:   Duy Roach is a 79 y.o. male with PMH of alcoholic liver cirrhosis, GERD, hyponatremia who presents to the ED from [address] [facility] with shortness of breath and chills. Sepsis rule out  COVID-19 positive   Patient came in to ED with tachycardia, tachypnea, CO2 15, LA 4.1. Responded well to 1L bolus. LA now 1.6. WBC normal. CXR/CT chest/abd/pel unremarkable for infectious etiology except for bladder wall thickening. Procal pending. UA pending.     - Endorses polyuria  - Bcx x1 for ESBL  - Suspect sepsis could be 2/2 to UTI  - Abx: Vanc, Merrem  - IVF @ 75mL/hr       Acute decompensated alcoholic liver cirrhosis (MELD-Na of 25 - 14-15% 90 day mortality)  Hx of alcoholic cirrhosis per patient and per nurse at assisted living. Patient is jaundiced and has caput medusae, as well as fatty liver and splenomegaly on imaging. Unable to access [facility] records at this time.  Patient reports that he does not have a liver doctor or GI doctor, only a PCP. Current MELD is 25. Last drink 5 months ago per patient. - Continue home rifaximin and lactulose  - Continue home 5 mg midodrine Q8H for SBP <100  - GI consult    Hyponatremia  Na 126 on admission. Suspect etiology could be due to volume overload. - Na 126  - IVF at 75 mL/Hr   - Na q12H  - Consider consulting Nephrology if Na does not improve    Chronic Conditions  Alcoholic polyneuropathy  - Continue home gabapentin 300 mg BID     GERD  - Continue home protonix 40 mg daily     HTN  Mild hypotension on admission  - Hold home metoprolol succinate 12.5 mg daily, restart if pressures remain normal or elevated     BPH/Urinary retention  - Continue home tamsulosin 0.4 mg QHS    Code Status: Full Code  FEN: ADULT DIET; Regular;  Low Sodium (2 gm)   PPX:   DISPO: IP    Nikolai Galvan DO, PGY-2  09/11/22  1:55 PM    This patient has been staffed and discussed with Tyler Dunbar MD.

## 2022-09-11 NOTE — PLAN OF CARE
Problem: Discharge Planning  Goal: Discharge to home or other facility with appropriate resources  9/11/2022 1126 by Tra Greenberg RN  Outcome: Progressing  9/11/2022 0647 by Briseyda Mcmanus RN  Outcome: Progressing     Problem: Pain  Goal: Verbalizes/displays adequate comfort level or baseline comfort level  9/11/2022 1126 by Tra Greenberg RN  Outcome: Progressing  Pt complain of right leg aches. Given tylenol. Pt satisfied upon reassessment. Will continue to assess and monitor.    9/11/2022 0647 by Briseyda Mcmanus RN  Outcome: Progressing

## 2022-09-12 LAB
ANION GAP SERPL CALCULATED.3IONS-SCNC: 11 MMOL/L (ref 3–16)
ANION GAP SERPL CALCULATED.3IONS-SCNC: 12 MMOL/L (ref 3–16)
ANION GAP SERPL CALCULATED.3IONS-SCNC: 12 MMOL/L (ref 3–16)
BASOPHILS ABSOLUTE: 0.1 K/UL (ref 0–0.2)
BASOPHILS RELATIVE PERCENT: 1.3 %
BUN BLDV-MCNC: 10 MG/DL (ref 7–20)
BUN BLDV-MCNC: 11 MG/DL (ref 7–20)
BUN BLDV-MCNC: 12 MG/DL (ref 7–20)
CALCIUM SERPL-MCNC: 7.6 MG/DL (ref 8.3–10.6)
CALCIUM SERPL-MCNC: 7.6 MG/DL (ref 8.3–10.6)
CALCIUM SERPL-MCNC: 7.8 MG/DL (ref 8.3–10.6)
CHLORIDE BLD-SCNC: 103 MMOL/L (ref 99–110)
CHLORIDE BLD-SCNC: 103 MMOL/L (ref 99–110)
CHLORIDE BLD-SCNC: 104 MMOL/L (ref 99–110)
CO2: 16 MMOL/L (ref 21–32)
CO2: 17 MMOL/L (ref 21–32)
CO2: 18 MMOL/L (ref 21–32)
CREAT SERPL-MCNC: 0.6 MG/DL (ref 0.8–1.3)
CREAT SERPL-MCNC: 0.7 MG/DL (ref 0.8–1.3)
CREAT SERPL-MCNC: 0.8 MG/DL (ref 0.8–1.3)
EOSINOPHILS ABSOLUTE: 0.1 K/UL (ref 0–0.6)
EOSINOPHILS RELATIVE PERCENT: 1.8 %
GFR AFRICAN AMERICAN: >60
GFR NON-AFRICAN AMERICAN: >60
GLUCOSE BLD-MCNC: 108 MG/DL (ref 70–99)
GLUCOSE BLD-MCNC: 110 MG/DL (ref 70–99)
GLUCOSE BLD-MCNC: 93 MG/DL (ref 70–99)
HCT VFR BLD CALC: 26.8 % (ref 40.5–52.5)
HEMOGLOBIN: 8.9 G/DL (ref 13.5–17.5)
LV EF: 55 %
LVEF MODALITY: NORMAL
LYMPHOCYTES ABSOLUTE: 0.7 K/UL (ref 1–5.1)
LYMPHOCYTES RELATIVE PERCENT: 14 %
MCH RBC QN AUTO: 35.5 PG (ref 26–34)
MCHC RBC AUTO-ENTMCNC: 33.2 G/DL (ref 31–36)
MCV RBC AUTO: 106.8 FL (ref 80–100)
MONOCYTES ABSOLUTE: 0.4 K/UL (ref 0–1.3)
MONOCYTES RELATIVE PERCENT: 8.9 %
NEUTROPHILS ABSOLUTE: 3.6 K/UL (ref 1.7–7.7)
NEUTROPHILS RELATIVE PERCENT: 74 %
PDW BLD-RTO: 15.8 % (ref 12.4–15.4)
PLATELET # BLD: 98 K/UL (ref 135–450)
PMV BLD AUTO: 10 FL (ref 5–10.5)
POTASSIUM REFLEX MAGNESIUM: 3.7 MMOL/L (ref 3.5–5.1)
POTASSIUM REFLEX MAGNESIUM: 3.9 MMOL/L (ref 3.5–5.1)
POTASSIUM REFLEX MAGNESIUM: 3.9 MMOL/L (ref 3.5–5.1)
RBC # BLD: 2.51 M/UL (ref 4.2–5.9)
SODIUM BLD-SCNC: 130 MMOL/L (ref 136–145)
SODIUM BLD-SCNC: 133 MMOL/L (ref 136–145)
SODIUM BLD-SCNC: 133 MMOL/L (ref 136–145)
WBC # BLD: 4.9 K/UL (ref 4–11)

## 2022-09-12 PROCEDURE — 80048 BASIC METABOLIC PNL TOTAL CA: CPT

## 2022-09-12 PROCEDURE — 6360000002 HC RX W HCPCS: Performed by: INTERNAL MEDICINE

## 2022-09-12 PROCEDURE — 6370000000 HC RX 637 (ALT 250 FOR IP)

## 2022-09-12 PROCEDURE — 1200000000 HC SEMI PRIVATE

## 2022-09-12 PROCEDURE — 99223 1ST HOSP IP/OBS HIGH 75: CPT | Performed by: INTERNAL MEDICINE

## 2022-09-12 PROCEDURE — 6360000004 HC RX CONTRAST MEDICATION: Performed by: INTERNAL MEDICINE

## 2022-09-12 PROCEDURE — 2580000003 HC RX 258

## 2022-09-12 PROCEDURE — 87641 MR-STAPH DNA AMP PROBE: CPT

## 2022-09-12 PROCEDURE — 36415 COLL VENOUS BLD VENIPUNCTURE: CPT

## 2022-09-12 PROCEDURE — 85025 COMPLETE CBC W/AUTO DIFF WBC: CPT

## 2022-09-12 PROCEDURE — 6360000002 HC RX W HCPCS

## 2022-09-12 PROCEDURE — C8929 TTE W OR WO FOL WCON,DOPPLER: HCPCS

## 2022-09-12 PROCEDURE — 2580000003 HC RX 258: Performed by: INTERNAL MEDICINE

## 2022-09-12 RX ORDER — BISACODYL 10 MG
10 SUPPOSITORY, RECTAL RECTAL DAILY PRN
Status: ON HOLD | COMMUNITY
End: 2022-09-14 | Stop reason: HOSPADM

## 2022-09-12 RX ORDER — AMINO ACIDS/PROTEIN HYDROLYS 11-80/30ML
30 LIQUID (ML) ORAL DAILY
COMMUNITY

## 2022-09-12 RX ORDER — TAMSULOSIN HYDROCHLORIDE 0.4 MG/1
0.4 CAPSULE ORAL EVERY EVENING
COMMUNITY

## 2022-09-12 RX ORDER — SODIUM CHLORIDE 1000 MG
1 TABLET, SOLUBLE MISCELLANEOUS 2 TIMES DAILY
Status: ON HOLD | COMMUNITY
End: 2022-09-14 | Stop reason: HOSPADM

## 2022-09-12 RX ORDER — FERROUS SULFATE 325(65) MG
325 TABLET ORAL
COMMUNITY

## 2022-09-12 RX ORDER — METOPROLOL SUCCINATE 25 MG/1
12.5 TABLET, EXTENDED RELEASE ORAL DAILY
COMMUNITY

## 2022-09-12 RX ORDER — THIAMINE MONONITRATE (VIT B1) 100 MG
100 TABLET ORAL DAILY
COMMUNITY

## 2022-09-12 RX ORDER — M-VIT,TX,IRON,MINS/CALC/FOLIC 27MG-0.4MG
1 TABLET ORAL DAILY
COMMUNITY

## 2022-09-12 RX ORDER — SENNA AND DOCUSATE SODIUM 50; 8.6 MG/1; MG/1
1 TABLET, FILM COATED ORAL DAILY PRN
Status: ON HOLD | COMMUNITY
End: 2022-09-14 | Stop reason: HOSPADM

## 2022-09-12 RX ORDER — BACITRACIN 500 [USP'U]/G
OINTMENT TOPICAL PRN
COMMUNITY

## 2022-09-12 RX ORDER — GUAIFENESIN/DEXTROMETHORPHAN 100-10MG/5
5 SYRUP ORAL 4 TIMES DAILY PRN
COMMUNITY

## 2022-09-12 RX ORDER — POLYETHYLENE GLYCOL 3350 17 G/17G
17 POWDER, FOR SOLUTION ORAL DAILY PRN
Status: ON HOLD | COMMUNITY
End: 2022-09-14 | Stop reason: HOSPADM

## 2022-09-12 RX ORDER — SUCRALFATE 1 G/1
1 TABLET ORAL 4 TIMES DAILY
COMMUNITY

## 2022-09-12 RX ORDER — MIDODRINE HYDROCHLORIDE 5 MG/1
5 TABLET ORAL 3 TIMES DAILY PRN
COMMUNITY

## 2022-09-12 RX ORDER — BENZONATATE 100 MG/1
100 CAPSULE ORAL 3 TIMES DAILY PRN
COMMUNITY

## 2022-09-12 RX ORDER — GABAPENTIN 300 MG/1
300 CAPSULE ORAL 2 TIMES DAILY
COMMUNITY

## 2022-09-12 RX ORDER — LACTULOSE 10 G/15ML
30 SOLUTION ORAL 3 TIMES DAILY
COMMUNITY

## 2022-09-12 RX ORDER — FOLIC ACID 1 MG/1
1 TABLET ORAL DAILY
COMMUNITY

## 2022-09-12 RX ORDER — PANTOPRAZOLE SODIUM 40 MG/1
40 TABLET, DELAYED RELEASE ORAL DAILY
COMMUNITY

## 2022-09-12 RX ORDER — BENZONATATE 100 MG/1
100 CAPSULE ORAL 3 TIMES DAILY PRN
Status: DISCONTINUED | OUTPATIENT
Start: 2022-09-12 | End: 2022-09-16 | Stop reason: HOSPADM

## 2022-09-12 RX ORDER — ERGOCALCIFEROL (VITAMIN D2) 1250 MCG
50000 CAPSULE ORAL WEEKLY
COMMUNITY

## 2022-09-12 RX ADMIN — SODIUM CHLORIDE, PRESERVATIVE FREE 10 ML: 5 INJECTION INTRAVENOUS at 07:45

## 2022-09-12 RX ADMIN — GABAPENTIN 300 MG: 300 CAPSULE ORAL at 20:33

## 2022-09-12 RX ADMIN — PERFLUTREN 1.65 MG: 6.52 INJECTION, SUSPENSION INTRAVENOUS at 09:16

## 2022-09-12 RX ADMIN — RIFAXIMIN 550 MG: 550 TABLET ORAL at 07:41

## 2022-09-12 RX ADMIN — DIPHENHYDRAMINE HYDROCHLORIDE 25 MG: 25 TABLET ORAL at 17:53

## 2022-09-12 RX ADMIN — DIPHENHYDRAMINE HYDROCHLORIDE 25 MG: 25 TABLET ORAL at 07:42

## 2022-09-12 RX ADMIN — MEROPENEM 1000 MG: 1 INJECTION, POWDER, FOR SOLUTION INTRAVENOUS at 11:09

## 2022-09-12 RX ADMIN — ACETAMINOPHEN 650 MG: 325 TABLET, FILM COATED ORAL at 17:53

## 2022-09-12 RX ADMIN — BENZONATATE 100 MG: 100 CAPSULE ORAL at 17:53

## 2022-09-12 RX ADMIN — GABAPENTIN 300 MG: 300 CAPSULE ORAL at 07:41

## 2022-09-12 RX ADMIN — ACETAMINOPHEN 650 MG: 325 TABLET, FILM COATED ORAL at 07:42

## 2022-09-12 RX ADMIN — VANCOMYCIN HYDROCHLORIDE 1000 MG: 10 INJECTION, POWDER, LYOPHILIZED, FOR SOLUTION INTRAVENOUS at 07:38

## 2022-09-12 RX ADMIN — TAMSULOSIN HYDROCHLORIDE 0.4 MG: 0.4 CAPSULE ORAL at 07:42

## 2022-09-12 RX ADMIN — VANCOMYCIN HYDROCHLORIDE 1000 MG: 10 INJECTION, POWDER, LYOPHILIZED, FOR SOLUTION INTRAVENOUS at 20:45

## 2022-09-12 RX ADMIN — RIFAXIMIN 550 MG: 550 TABLET ORAL at 20:33

## 2022-09-12 RX ADMIN — PANTOPRAZOLE SODIUM 40 MG: 40 TABLET, DELAYED RELEASE ORAL at 06:24

## 2022-09-12 RX ADMIN — MEROPENEM 1000 MG: 1 INJECTION, POWDER, FOR SOLUTION INTRAVENOUS at 17:48

## 2022-09-12 RX ADMIN — SODIUM CHLORIDE, PRESERVATIVE FREE 10 ML: 5 INJECTION INTRAVENOUS at 20:34

## 2022-09-12 RX ADMIN — MEROPENEM 1000 MG: 1 INJECTION, POWDER, FOR SOLUTION INTRAVENOUS at 03:37

## 2022-09-12 RX ADMIN — LACTULOSE 20 G: 20 SOLUTION ORAL at 07:41

## 2022-09-12 ASSESSMENT — PAIN DESCRIPTION - DESCRIPTORS
DESCRIPTORS: ACHING

## 2022-09-12 ASSESSMENT — PAIN DESCRIPTION - FREQUENCY
FREQUENCY: INTERMITTENT

## 2022-09-12 ASSESSMENT — PAIN SCALES - GENERAL
PAINLEVEL_OUTOF10: 0
PAINLEVEL_OUTOF10: 0
PAINLEVEL_OUTOF10: 3
PAINLEVEL_OUTOF10: 0
PAINLEVEL_OUTOF10: 3

## 2022-09-12 ASSESSMENT — ENCOUNTER SYMPTOMS
NAUSEA: 0
ABDOMINAL DISTENTION: 1
CONSTIPATION: 0
VOMITING: 0
DIARRHEA: 1
ABDOMINAL PAIN: 0
DIARRHEA: 0
SHORTNESS OF BREATH: 1
BLOOD IN STOOL: 0
COUGH: 1
BACK PAIN: 0

## 2022-09-12 ASSESSMENT — PAIN DESCRIPTION - PAIN TYPE
TYPE: ACUTE PAIN

## 2022-09-12 ASSESSMENT — PAIN DESCRIPTION - LOCATION
LOCATION: HEAD
LOCATION: HEAD

## 2022-09-12 ASSESSMENT — PAIN DESCRIPTION - ONSET
ONSET: ON-GOING
ONSET: ON-GOING

## 2022-09-12 NOTE — CONSULTS
Consult Note      Soha Fix  1955    Consultant: Baltazar Oliveros  Reason for Consult:  Decompensated cirrhosis  Requesting Physician:  Bj Segura    CHIEF COMPLAINT:  Short of breath    History Obtained From:  patient, electronic medical record    HISTORY OF PRESENT ILLNESS:                The patient is a 79 y.o. male with significant past medical history of cirrhosis who presents with dyspnea. Diagnosed with COVID on 8/29 but symptoms progressed with some abdominal discomfort, chest discomfort, nausea and chills. No melena. No hematochezia.  +cough    Past Medical History:        Diagnosis Date    Alcoholic cirrhosis of liver (HCC)     Alcoholic polyneuropathy (HCC)     Anemia of chronic disorder     COVID-19     Esophageal reflux     Essential hypertension, benign      Past Surgical History:    History reviewed. No pertinent surgical history. Medications at Home:  No medications prior to admission.   Current Medications:    Current Facility-Administered Medications: [COMPLETED] meropenem (MERREM) 1,000 mg in sodium chloride 0.9 % 100 mL IVPB (mini-bag), 1,000 mg, IntraVENous, Once **FOLLOWED BY** meropenem (MERREM) 1,000 mg in sodium chloride 0.9 % 100 mL IVPB (mini-bag), 1,000 mg, IntraVENous, Q8H  sodium chloride flush 0.9 % injection 5-40 mL, 5-40 mL, IntraVENous, 2 times per day  sodium chloride flush 0.9 % injection 5-40 mL, 5-40 mL, IntraVENous, PRN  0.9 % sodium chloride infusion, , IntraVENous, PRN  [Held by provider] enoxaparin Sodium (LOVENOX) injection 30 mg, 30 mg, SubCUTAneous, BID  acetaminophen (TYLENOL) tablet 650 mg, 650 mg, Oral, Q6H PRN **OR** acetaminophen (TYLENOL) suppository 650 mg, 650 mg, Rectal, Q6H PRN  vancomycin (VANCOCIN) 1,000 mg in dextrose 5 % 250 mL IVPB, 1,000 mg, IntraVENous, Q12H  gabapentin (NEURONTIN) capsule 300 mg, 300 mg, Oral, BID  [Held by provider] lactulose (CHRONULAC) 10 GM/15ML solution 20 g, 20 g, Oral, TID  midodrine (PROAMATINE) tablet 5 mg, 5 mg, Oral, TID WC  pantoprazole (PROTONIX) tablet 40 mg, 40 mg, Oral, QAM AC  tamsulosin (FLOMAX) capsule 0.4 mg, 0.4 mg, Oral, Daily  rifAXIMin (XIFAXAN) tablet 550 mg, 550 mg, Oral, BID  diphenhydrAMINE (BENADRYL) tablet 25 mg, 25 mg, Oral, Q6H PRN  ondansetron (ZOFRAN) injection 4 mg, 4 mg, IntraVENous, Q6H PRN  prochlorperazine (COMPAZINE) injection 10 mg, 10 mg, IntraVENous, Q6H PRN  Allergies:  Ceftriaxone    Social History:    TOBACCO:   reports that he has quit smoking. His smoking use included cigarettes. He has been exposed to tobacco smoke. He has never used smokeless tobacco.  ETOH:   reports that he does not currently use alcohol. DRUGS:   reports no history of drug use. Family History:   History reviewed. No pertinent family history. REVIEW OF SYSTEMS:    A comprehensive 12 point review of systems is negative except as documented above.      PHYSICAL EXAM:      Vitals:    /67   Pulse 99   Temp 100.3 °F (37.9 °C) (Oral)   Resp 19   Ht 5' 9\" (1.753 m)   Wt 213 lb 13.5 oz (97 kg)   SpO2 100%   BMI 31.58 kg/m²     General: No acute distress  HEENT: PERRL, EOMI, oral mucosa moist and intact, no sclericterus  Neck: no thyromegaly  Lymphatic: no cervical or supraclavicular lymphadenopathy  Heart: no m/r/g; +s1/s2 rrr  Lungs: rales bilaterally  Abdomen: soft, nt, nd +BS; +caput medusae  Extremities: 2+pulses, no edema  Skin: no rashes or lesions  Neuro: a&o x 3; no gross deficit;  no asterixis  DATA:    Recent Labs     09/10/22  1335 09/11/22  0421 09/12/22  0424   WBC 7.6 7.6 4.9   HGB 9.8* 7.9* 8.9*   HCT 30.3* 22.9* 26.8*   .1* 103.9* 106.8*    86* 98*     Recent Labs     09/11/22  2155 09/12/22  0424 09/12/22  0909   * 133* 130*   K 3.6 3.9 3.7    103 103   CO2 18* 18* 16*   BUN 14 12 11   CREATININE 0.8 0.7* 0.8     Recent Labs     09/10/22  1335 09/11/22  2155   * 68*   ALT 33 23   BILIDIR  --  4.4*   BILITOT 9.4* 6.2*   ALKPHOS 310* 218*     No results for input(s):

## 2022-09-12 NOTE — CARE COORDINATION
Case Management Assessment           Initial Evaluation                Date / Time of Evaluation: 9/12/2022 2:30 PM                 Assessment Completed by: Ramonita Riedel, RN    Patient Name: Remedios Philippe     YOB: 1955  Diagnosis: Hypomagnesemia [Y82.55]  Alcoholic cirrhosis of liver without ascites ([facility] [K70.30]  Septic shock ([facility] [A41.9, R65.21]  Sepsis, due to unspecified organism, unspecified whether acute organ dysfunction present ([facility] [A41.9]  COVID [U07.1]     Date / Time: 9/10/2022  1:04 PM    Patient Admission Status: Inpatient    If patient is discharged prior to next notation, then this note serves as note for discharge by case management. Current PCP: No primary care provider on file. Clinic Patient: No    Chart Reviewed: Yes  Patient/ Family Interviewed: Yes    Initial assessment completed at bedside with: patient over the phone due to COVID+, also spoke with  at [facility] who stated the pt admitted to them on 9/2 and that pt is through his isolation period. Hospitalization in the last 30 days: No    Emergency Contacts:  No emergency contact information on file. Advance Directives:   Code Status: Full Code    Healthcare Power of : No    Financial  Payor: Jonne Boast / Plan: Debbi Hall / Product Type: *No Product type* /     Pre-cert required for SNF: Yes    Pharmacy  No Pharmacies Listed    Potential assistance Purchasing Medications:    Does Patient want to participate in local refill/ meds to beds program?:      Meds To Beds General Rules:  1. Can ONLY be done Monday- Friday between 8:30am-5pm  2. Prescription(s) must be in pharmacy by 3pm to be filled same day  3. Copy of patient's insurance/ prescription drug card and patient face sheet must be sent along with the prescription(s)  4. Cost of Rx cannot be added to hospital bill. If financial assistance is needed, please contact unit  or ;  Case manager or  CANNOT provide pharmacy voucher for patients co-pays  5. Patients can then  the prescription on their way out of the hospital at discharge, or pharmacy can deliver to the bedside if staff is available. (payment due at time of pick-up or delivery - cash, check, or card accepted)     Able to afford home medications/ co-pay costs: Yes    ADLS  Support Systems:      PT AM-PAC:   /24  OT AM-PAC:   /24    [place]: [facility]  Steps: none    Plans to RETURN to current housing: Yes  Barriers to RETURNING to current housing: medical complications. DISCHARGE PLAN:  Disposition: [place] (SNF): [facility]  Phone: [phone] Fax: [phone]    Transportation PLAN for discharge: EMS transportation     Factors facilitating achievement of predicted outcomes: Caregiver support and Cooperative    Barriers to discharge: Medical complications and Medication managment    Additional Case Management Notes: CM spoke with pt over the phone who stated he was recently at [place] [address] but not sure which one he was at last. CM confirmed pt admitted from [facility]. Pt can return skilled at discharge will need precert. The Plan for Transition of Care is related to the following treatment goals of Hypomagnesemia [A20.93]  Alcoholic cirrhosis of liver without ascites ([facility] [K70.30]  Septic shock ([facility] [A41.9, R65.21]  Sepsis, due to unspecified organism, unspecified whether acute organ dysfunction present ([facility] [A41.9]  COVID [U07.1]    The Patient and/or patient representative Robert Vasquez and his family were provided with a choice of provider and agrees with the discharge plan Yes    Freedom of choice list was provided with basic dialogue that supports the patient's individualized plan of care/goals and shares the quality data associated with the providers.  Yes    Care Transition patient: No    Fatemeh Jacques RN  [facility]  Case Management Department  Ph: [phone]   Fax: [phone]

## 2022-09-12 NOTE — CONSULTS
CARDIOLOGY  CONSULTATION         Reason for Consultation/Chief Complaint: R/o pericardial effusion       History of Present Illness:  Elan Khanna is a 79 y.o. with hx Etoh cirrhosis (MELD-Na 25), GERD who presented to the hospital with complaints of dyspnea and chills. In ED pt was tachycardic, tachypneic and had CO2 15, LA 4.1, Tbili 9.4. Started on vanc/merrem for sepsis. Of note, POCUS was concerning for possible pericardial effusion. CTA chest showed fatty liver and splenomegaly, but was negative for cardiac abnormality. COVID+ on 8/29/22. Pt currently resting comfortably. He c/o several episodes of recent diarrhea, but otherwise has no complaints. Denies any headache, chest pain, palpitations, n/v, lightheadedness, dizziness. Pt states he was admitted to [facility] in the past month after a fall at home. Per ED note, pt was treated for hyponatremia and discharged to SNF. Past Medical History:   has a past medical history of Alcoholic cirrhosis of liver ([facility] Alcoholic polyneuropathy ([facility] Anemia of chronic disorder, COVID-19, Esophageal reflux, and Essential hypertension, benign. Surgical History:   has no past surgical history on file. Social History:   reports that he has quit smoking. His smoking use included cigarettes. He has been exposed to tobacco smoke. He has never used smokeless tobacco. He reports that he does not currently use alcohol. He reports that he does not use drugs. Family History:  No evidence for sudden cardiac death or premature CAD    Home Medications:  Were reviewed and are listed in nursing record.  and/or listed below  Prior to Admission medications    Not on [facility] Medications:   meropenem  1,000 mg IntraVENous Q8H    sodium chloride flush  5-40 mL IntraVENous 2 times per day    [Held by provider] enoxaparin  30 mg SubCUTAneous BID    vancomycin  1,000 mg IntraVENous Q12H    gabapentin  300 mg Oral BID    [Held by provider] lactulose  20 g Oral TID    midodrine  5 mg Oral TID     pantoprazole  40 mg Oral QAM AC    tamsulosin  0.4 mg Oral Daily    rifAXIMin  550 mg Oral BID     sodium chloride flush, sodium chloride, acetaminophen **OR** acetaminophen, diphenhydrAMINE, ondansetron, prochlorperazine   sodium chloride         Allergies:  Ceftriaxone     Review of Systems:   Review of Systems   Constitutional:  Negative for chills, diaphoresis and fever. Respiratory:  Positive for shortness of breath. Cardiovascular:  Negative for chest pain and palpitations. Gastrointestinal:  Positive for abdominal distention and diarrhea. Negative for blood in stool, constipation, nausea and vomiting. Musculoskeletal:  Negative for back pain. Neurological:  Negative for dizziness and light-headedness. All other systems reviewed and are negative. Physical Examination:    Vitals:    09/12/22 0733   BP: 127/67   Pulse: 99   Resp: 19   Temp: 100.3 °F (37.9 °C)   SpO2: 100%    Weight: 213 lb 13.5 oz (97 kg)       Physical Exam  Vitals and nursing note reviewed. Constitutional:       General: He is not in acute distress. Appearance: He is obese. He is not ill-appearing or diaphoretic. HENT:      Head: Normocephalic and atraumatic. Mouth/Throat:      Mouth: Mucous membranes are moist.      Pharynx: Oropharynx is clear. Comments: Poor dentition  Neck:      Comments: No JVD appreciated  Cardiovascular:      Rate and Rhythm: Normal rate and regular rhythm. Pulses: Normal pulses. Heart sounds: Normal heart sounds. No murmur heard. No friction rub. No gallop. Pulmonary:      Effort: Pulmonary effort is normal. No respiratory distress. Breath sounds: No wheezing, rhonchi or rales. Abdominal:      General: There is distension (diffuse). Palpations: Abdomen is soft. Tenderness: There is no abdominal tenderness. Musculoskeletal:      Right lower leg: No edema. Left lower leg: No edema.    Skin:     General: Skin is warm and dry. Coloration: Skin is jaundiced. Skin is not pale. Neurological:      Mental Status: He is alert and oriented to person, place, and time. Labs  CBC:   Lab Results   Component Value Date/Time    WBC 4.9 2022 04:24 AM    RBC 2.51 2022 04:24 AM    HGB 8.9 2022 04:24 AM    HCT 26.8 2022 04:24 AM    .8 2022 04:24 AM    RDW 15.8 2022 04:24 AM    PLT 98 2022 04:24 AM     CMP:    Lab Results   Component Value Date/Time     2022 09:09 AM    K 3.7 2022 09:09 AM     2022 09:09 AM    CO2 16 2022 09:09 AM    BUN 11 2022 09:09 AM    CREATININE 0.8 2022 09:09 AM    GFRAA >60 2022 09:09 AM    AGRATIO 0.9 09/10/2022 01:35 PM    LABGLOM >60 2022 09:09 AM    GLUCOSE 110 2022 09:09 AM    PROT 4.6 2022 09:55 PM    CALCIUM 7.6 2022 09:09 AM    BILITOT 6.2 2022 09:55 PM    ALKPHOS 218 2022 09:55 PM    AST 68 2022 09:55 PM    ALT 23 2022 09:55 PM     PT/INR:  No results found for: PTINR  Recent Labs     09/10/22  1335   TROPONINI <0.01       EK/10/22: Sinus tachycardia w/ rate 150    Assessment  Patient Active Problem List   Diagnosis    Septic shock (HCC)        Impression:      - Echo reviewed by Dr. Jose E Gomez. Minimal pericardial fluid  - HDS    Recommendations:    I had the opportunity to review the clinical symptoms and presentation of Arnaldo Christopher. - No intervention required  - Will sign off at this time  - Please contact the team with any questions/concerns    Thank you for allowing to us to participate in the care or Arnaldo Christopher. All questions and concerns were addressed to the patient/family. Alternatives to my treatment were discussed. The note was completed using EMR. Every effort was made to ensure accuracy; however, inadvertent computerized transcription errors may be present.        Huy Pall  Internal Medicine Resident  PGY-1      Staff Note      Patient seen and evaluated with the medical resident. I was physically present during the critical portions of the service when performed by the resident including the assessment and management of the patient. No significant pericardial effusion. Normal diastolic and systolic function. Can hydrate pt. Cirrhosis and diarrhea with probable dehydration. I agree with the findings and plans as described.

## 2022-09-12 NOTE — PROGRESS NOTES
Physician Progress Note      PATIENT:               Char Ray  CSN #:                  [id number]  :                       1955  ADMIT DATE:       9/10/2022 1:04 PM  [address] [place] DATE:  Xin Rizzo  PROVIDER #:        Michelle Hargrove MD          QUERY TEXT:    Patient admitted with UTI and COVID-19 infection. Noted documentation of   sepsis. In order to support the diagnosis of sepsis, please include additional   clinical indicators in your documentation. Or please document if the   diagnosis of sepsis has been ruled out after further study    The medical record reflects the following:  Risk Factors: UTI  Clinical Indicators: SIRS on admit: HR: 150- 111, RR: 27- 26- 29. WBC WNL, T.   98.4- 100.2 orally. 1/2 Blood cx:  Escherichia coli ESBL. Per pn \"Sepsis rule   out; Suspect sepsis could be 2/2 to UTI\". Treatment: 1L LR bolus, IVF, Merrem, Vanc, Blood cx, Urine cx  Options provided:  -- Sepsis present as evidenced by, Please document evidence. -- Sepsis was ruled out after study  -- Other - I will add my own diagnosis  -- Disagree - Not applicable / Not valid  -- Disagree - Clinically unable to determine / Unknown  -- Refer to Clinical Documentation Reviewer    PROVIDER RESPONSE TEXT:    Sepsis is present as evidenced by Jessee Brooks bacteremia presenting as cystitis,   tachycardia w/ hr 150s and hypotension w/ sbp 90s. Query created by:  Manoj Seen on 2022 11:06 AM      Electronically signed by:  Michelle Hargrove MD 2022 11:15 AM

## 2022-09-12 NOTE — PROGRESS NOTES
Progress Note    Admit Date: 9/10/2022  Day: 2  Diet: ADULT DIET; Regular; Low Sodium (2 gm)    CC: SOB    Interval history: ELLYN. Feels better than when he first came in. Less short of breath. Complains of 2 episodes of non bloody diarrhea. Clarifies that he is not on lactulose at home. Still with RUQ pain. Medications:     Scheduled Meds:   meropenem  1,000 mg IntraVENous Q8H    sodium chloride flush  5-40 mL IntraVENous 2 times per day    [Held by provider] enoxaparin  30 mg SubCUTAneous BID    vancomycin  1,000 mg IntraVENous Q12H    gabapentin  300 mg Oral BID    lactulose  20 g Oral TID    midodrine  5 mg Oral TID WC    pantoprazole  40 mg Oral QAM AC    tamsulosin  0.4 mg Oral Daily    rifAXIMin  550 mg Oral BID     Continuous Infusions:   sodium chloride       PRN Meds:perflutren lipid microspheres, sodium chloride flush, sodium chloride, acetaminophen **OR** acetaminophen, diphenhydrAMINE, ondansetron, prochlorperazine    Objective:   Vitals:   T-max:  Patient Vitals for the past 8 hrs:   BP Temp Temp src Pulse Resp SpO2 Weight   09/12/22 0733 127/67 100.3 °F (37.9 °C) Oral 99 19 100 % --   09/12/22 0412 -- -- -- -- -- -- 213 lb 13.5 oz (97 kg)   09/12/22 0340 130/77 97.5 °F (36.4 °C) Oral 100 20 98 % --   09/12/22 0021 129/71 98.1 °F (36.7 °C) Oral 83 18 100 % --         Intake/Output Summary (Last 24 hours) at 9/12/2022 0804  Last data filed at 9/12/2022 0600  Gross per 24 hour   Intake 471.75 ml   Output 0 ml   Net 471.75 ml         Review of Systems   Constitutional:  Positive for chills. Negative for fever. HENT:  Negative for congestion. Respiratory:  Positive for cough and shortness of breath. Cardiovascular:  Negative for chest pain and palpitations. Gastrointestinal:  Negative for abdominal pain, diarrhea, nausea and vomiting. Endocrine: Positive for polyuria. Genitourinary:  Negative for dysuria. Musculoskeletal:  Negative for arthralgias and myalgias.    Neurological:  Negative for headaches. Physical Exam  Constitutional:       General: He is not in acute distress. Appearance: Normal appearance. He is obese. He is not ill-appearing or diaphoretic. HENT:      Head: Normocephalic and atraumatic. Eyes:      Extraocular Movements: Extraocular movements intact. Cardiovascular:      Rate and Rhythm: Normal rate and regular rhythm. Heart sounds: No murmur heard. No friction rub. No gallop. Pulmonary:      Breath sounds: No wheezing, rhonchi or rales. Abdominal:      General: There is distension. Tenderness: There is no abdominal tenderness. There is no guarding or rebound. Musculoskeletal:      Right lower leg: No edema. Left lower leg: No edema. Skin:     General: Skin is warm. Findings: Lesion and rash present. Comments: Multiple scratches on abdomen. Multiple scratches on RLE   Neurological:      Mental Status: He is alert. Mental status is at baseline. LABS:    CBC:   Recent Labs     09/10/22  1335 09/11/22  0421 09/12/22  0424   WBC 7.6 7.6 4.9   HGB 9.8* 7.9* 8.9*   HCT 30.3* 22.9* 26.8*    86* 98*   .1* 103.9* 106.8*       Renal:    Recent Labs     09/10/22  1335 09/11/22  0421 09/11/22  2155 09/12/22  0424   * 126* 131* 133*   K 4.2 4.1 3.6 3.9   CL 98* 99 101 103   CO2 15* 19* 18* 18*   BUN 10 12 14 12   CREATININE 0.9 0.9 0.8 0.7*   GLUCOSE 122* 114* 178* 93   CALCIUM 8.5 7.6* 7.4* 7.8*   MG 1.50*  --   --   --    ANIONGAP 16 8 12 12       Hepatic:   Recent Labs     09/10/22  1335 09/11/22  2155   * 68*   ALT 33 23   BILITOT 9.4* 6.2*   BILIDIR  --  4.4*   PROT 6.3* 4.6*   LABALBU 2.9* 2.2*   ALKPHOS 310* 218*       Troponin:   Recent Labs     09/10/22  1335   TROPONINI <0.01       BNP: No results for input(s): BNP in the last 72 hours. Lipids: No results for input(s): CHOL, HDL in the last 72 hours.     Invalid input(s): LDLCALCU, TRIGLYCERIDE  ABGs:  No results for input(s): PHART, NHI5KBI, PO2ART, QOS2ITU, BEART, THGBART, S4WAIMXH, TYR1XFF in the last 72 hours. INR:   Recent Labs     09/10/22  1335   INR 1.43*       Lactate: No results for input(s): LACTATE in the last 72 hours. Cultures:  -----------------------------------------------------------------  RAD:   CTA ABDOMEN PELVIS W WO CONTRAST   Final Result      No sign of any aneurysm or dissection of the aorta or branches of the aorta      Incidental findings include fatty liver, borderline in size and moderate splenomegaly      Cholelithiasis with trace free fluid in the abdomen along left paracolic gutter of uncertain etiology      Scattered diverticulosis but no sign of any acute or active diverticulitis or obstruction      Bladder wall thickening appreciated. Fat-containing inguinal hernias noted bilaterally      CTA CHEST W WO CONTRAST   Final Result      No sign of any aneurysm or dissection of the aorta or branches of the aorta      Incidental findings include fatty liver, borderline in size and moderate splenomegaly      Cholelithiasis with trace free fluid in the abdomen along left paracolic gutter of uncertain etiology      Scattered diverticulosis but no sign of any acute or active diverticulitis or obstruction      Bladder wall thickening appreciated. Fat-containing inguinal hernias noted bilaterally      XR CHEST PORTABLE   Final Result      No acute radiographic abnormality. Assessment/Plan:   Liza Albarran is a 79 y.o. male with PMH of alcoholic liver cirrhosis, GERD, hyponatremia who presents to the ED from [address] [facility] facility with shortness of breath and chills. Sepsis rule out  COVID-19 positive   Patient came in to ED with tachycardia, tachypnea, CO2 15, LA 4.1. Responded well to 1L bolus. LA now 1.6. WBC normal. CXR/CT chest/abd/pel unremarkable for infectious etiology except for bladder wall thickening. Procal pending.  UA w positive nitrite, large LE.   - Endorses polyuria  - Bcx x1 for ESBL  - Suspect sepsis could be 2/2 to UTI  - Abx: Carmelo Montenegro  - IVF @ 75mL/hr    Acute decompensated alcoholic liver cirrhosis (MELD-Na of 25 - 14-15% 90 day mortality)  Hx of alcoholic cirrhosis per patient and per nurse at assisted living. Patient is jaundiced and has caput medusae, as well as fatty liver and splenomegaly on imaging. Unable to access [place] records at this time. Patient reports that he does not have a liver doctor or GI doctor, only a PCP. Current MELD is 25. Last drink 5 months ago per patient. - Continue home rifaximin  - Holding lactulose, patient doesn't take at home, complaining of diarrhea  - Continue home 5 mg midodrine Q8H for SBP <100  - GI consult    Hyponatremia  Na 126 on admission. Suspect etiology could be due to volume overload. - Na 133  - IVF at 75 mL/Hr   - Na q12H    ? Pericardial effusion  Seen on POCUS echo in ED - mixed density concerned for clotted blood, though not seen on CTA chest  - No concern for tamponade physiology  - Cards consult    Chronic Conditions  Alcoholic polyneuropathy  - Continue home gabapentin 300 mg BID     GERD  - Continue home protonix 40 mg daily     HTN  Mild hypotension on admission  - Hold home metoprolol succinate 12.5 mg daily, restart if pressures remain normal or elevated     BPH/Urinary retention  - Continue home tamsulosin 0.4 mg QHS    Code Status: Full Code  FEN: ADULT DIET; Regular;  Low Sodium (2 gm)   PPX: Protonix  DISPO: Marquis Julio MD, PGY-1  09/12/22  8:04 AM    This patient has been staffed and discussed with Melita Dennison MD.

## 2022-09-12 NOTE — PLAN OF CARE
Problem: Discharge Planning  Goal: Discharge to home or other facility with appropriate resources  9/12/2022 0750 by Renee Spear RN  Outcome: Progressing  9/12/2022 0455 by Pamula Dakins, RN  Outcome: Progressing  9/12/2022 0454 by Pamula Dakins, RN  Outcome: Progressing     Problem: Pain  Goal: Verbalizes/displays adequate comfort level or baseline comfort level  9/12/2022 0750 by Renee Spear RN  Outcome: Progressing  9/12/2022 0455 by Pamula Dakins, RN  Outcome: Progressing  9/12/2022 0454 by Pamula Dakins, RN  Outcome: Progressing     Problem: Skin/Tissue Integrity  Goal: Absence of new skin breakdown  Description: 1. Monitor for areas of redness and/or skin breakdown  2. Assess vascular access sites hourly  3. Every 4-6 hours minimum:  Change oxygen saturation probe site  4. Every 4-6 hours:  If on nasal continuous positive airway pressure, respiratory therapy assess nares and determine need for appliance change or resting period.   9/12/2022 0750 by Renee Spear RN  Outcome: Progressing  Pt kept clean and dry, bathroom assistance q2hrs and prn  9/12/2022 0455 by Pamula Dakins, RN  Outcome: Progressing  9/12/2022 0454 by Pamula Dakins, RN  Outcome: Progressing

## 2022-09-12 NOTE — ACP (ADVANCE CARE PLANNING)
Advance Care Planning     Advance Care Planning Inpatient Note  Spiritual Care Department    Today's Date: 9/12/2022  Unit: [place] [address] PCU    Received request from [employer]. Upon review of chart and communication with care team, patient's decision making abilities are not in question. . Patient was/were present in the room during visit. Goals of ACP Conversation:  Discuss advance care planning documents    Health Care Decision Makers:     No healthcare decision makers have been documented. Click here to complete [facility] including selection of the Healthcare Decision Maker Relationship (ie \"Primary\")  Summary:  No Decision Maker named by patient at this time    PT found to be sleeping (?). F/U required.      AOutcomes/Plan:  ACP Discussion: Postponed    Electronically signed by Chaplain Venkatesh on 9/12/2022 at 1:32 PM

## 2022-09-12 NOTE — PROGRESS NOTES
Clinical Pharmacy Progress Note    Vancomycin - Management by Pharmacy    Consult Date(s): 9/10/22  Consulting Provider(s): Jannie    Assessment / Plan    Sepsis - Vancomycin  Concurrent Antimicrobials: Merrem (Day #1)  Day of Vanc Therapy / Ordered Duration: Day # 3/7  Current Dosing Method: Bayesian-Guided AUC Dosing  Therapeutic Goal: 400-600 mg/L*hr  Current Dose / Frequency: 1000 mg every 12 hours  Plan / Rationale:   SCr stable this morning at 0.8 mg/dL. Random level drawn yesterday 9/11 resulted at 6.4 mg/L. Current regimen predicts an AUC and trough of 523 mg/L.hr and 17.1 mg/L respectively. Will continue current regimen and get a random level in am of 9/13  Will continue to monitor clinical condition and make adjustments to regimen as appropriate. Please call with any questions. Thanks! Sheela Pass) Carver Seip, PharmD  PGY1 Pharmacy Resident  The [address] [place] [address] St: [phone]          Interval update: Patient had tmax of 100.3 overnight, blood Cx resulted in ESBL e. Coli - patient switched from cefepime to merrem, liver panel has improved since admission. Subjective/Objective: Mr. Duy Roach is a 79 y.o. male with a PMHx significant for alcoholic cirrhosis, presented from [facility] with SOB x3 days. Patient was diagnosed with COVID on 8/29. Patient admitted for sepsis 2/2 COVID-19 infection. Pharmacy has been consulted to dose vancomycin.     Ht Readings from Last 1 Encounters:   09/10/22 5' 9\" (1.753 m)     Wt Readings from Last 1 Encounters:   09/12/22 213 lb 13.5 oz (97 kg)       Current & Prior Antimicrobial Regimen(s):  Vancomycin 1000 mg IV q12h (9/10-Current)  Merrem 1000 mg q8H (9/11-current)    Level(s) / Doses:    Date Time Dose Level / Type of Level Interpretation   9/11 0421 1000 mg q12h Random= 6.4 mg/L -Drawn ~12 hr after 1st dose  -Estimated   -Continue regimen   9/13 0600 1000 mg q12H Random Pending    Note: Serum levels collected for AUC-based dosing may be high if collected in close proximity to the dose administered. This is not necessarily indicative of toxicity. Cultures & Sensitivities:    Date Site Micro Susceptibility / Result   9/10 Blood Cx x2 ESBL e. Coli Pending   9/10 Urine Cx Sent    9/10 Covid 19 Positive      Labs / Ancillary Data:    Estimated Creatinine Clearance: 103 mL/min (based on SCr of 0.8 mg/dL). Recent Labs     09/10/22  1335 09/11/22  0421 09/11/22  2155 09/12/22  0424 09/12/22  0909   CREATININE 0.9 0.9 0.8 0.7* 0.8   BUN 10 12 14 12 11   WBC 7.6 7.6  --  4.9  --          Additional Lab Values / Findings of Note:    Procalcitonin: No results for input(s): PROCAL in the last 72 hours.

## 2022-09-13 PROBLEM — Z16.12 INFECTION DUE TO ESBL-PRODUCING ESCHERICHIA COLI: Status: ACTIVE | Noted: 2022-09-13

## 2022-09-13 PROBLEM — Z16.12 UTI DUE TO EXTENDED-SPECTRUM BETA LACTAMASE (ESBL) PRODUCING ESCHERICHIA COLI: Status: ACTIVE | Noted: 2022-09-13

## 2022-09-13 PROBLEM — B96.29 UTI DUE TO EXTENDED-SPECTRUM BETA LACTAMASE (ESBL) PRODUCING ESCHERICHIA COLI: Status: ACTIVE | Noted: 2022-09-13

## 2022-09-13 PROBLEM — K70.30 ALCOHOLIC CIRRHOSIS OF LIVER WITHOUT ASCITES (HCC): Status: ACTIVE | Noted: 2022-09-13

## 2022-09-13 PROBLEM — A49.8 INFECTION DUE TO ESBL-PRODUCING ESCHERICHIA COLI: Status: ACTIVE | Noted: 2022-09-13

## 2022-09-13 PROBLEM — N39.0 UTI DUE TO EXTENDED-SPECTRUM BETA LACTAMASE (ESBL) PRODUCING ESCHERICHIA COLI: Status: ACTIVE | Noted: 2022-09-13

## 2022-09-13 LAB
ALBUMIN SERPL-MCNC: 2.3 G/DL (ref 3.4–5)
ALP BLD-CCNC: 253 U/L (ref 40–129)
ALT SERPL-CCNC: 25 U/L (ref 10–40)
ANION GAP SERPL CALCULATED.3IONS-SCNC: 11 MMOL/L (ref 3–16)
ANION GAP SERPL CALCULATED.3IONS-SCNC: 9 MMOL/L (ref 3–16)
ANION GAP SERPL CALCULATED.3IONS-SCNC: 9 MMOL/L (ref 3–16)
AST SERPL-CCNC: 86 U/L (ref 15–37)
BASOPHILS ABSOLUTE: 0 K/UL (ref 0–0.2)
BASOPHILS RELATIVE PERCENT: 1.5 %
BILIRUB SERPL-MCNC: 5.3 MG/DL (ref 0–1)
BILIRUBIN DIRECT: 3.5 MG/DL (ref 0–0.3)
BILIRUBIN, INDIRECT: 1.8 MG/DL (ref 0–1)
BUN BLDV-MCNC: 7 MG/DL (ref 7–20)
BUN BLDV-MCNC: 7 MG/DL (ref 7–20)
BUN BLDV-MCNC: 8 MG/DL (ref 7–20)
CALCIUM SERPL-MCNC: 7.8 MG/DL (ref 8.3–10.6)
CALCIUM SERPL-MCNC: 7.8 MG/DL (ref 8.3–10.6)
CALCIUM SERPL-MCNC: 8.1 MG/DL (ref 8.3–10.6)
CHLORIDE BLD-SCNC: 100 MMOL/L (ref 99–110)
CHLORIDE BLD-SCNC: 103 MMOL/L (ref 99–110)
CHLORIDE BLD-SCNC: 105 MMOL/L (ref 99–110)
CO2: 19 MMOL/L (ref 21–32)
CREAT SERPL-MCNC: 0.6 MG/DL (ref 0.8–1.3)
CULTURE, BLOOD 2: ABNORMAL
CULTURE, BLOOD 2: ABNORMAL
EOSINOPHILS ABSOLUTE: 0.1 K/UL (ref 0–0.6)
EOSINOPHILS RELATIVE PERCENT: 2.5 %
GFR AFRICAN AMERICAN: >60
GFR NON-AFRICAN AMERICAN: >60
GLUCOSE BLD-MCNC: 80 MG/DL (ref 70–99)
GLUCOSE BLD-MCNC: 89 MG/DL (ref 70–99)
GLUCOSE BLD-MCNC: 97 MG/DL (ref 70–99)
HCT VFR BLD CALC: 25.6 % (ref 40.5–52.5)
HEMOGLOBIN: 8.7 G/DL (ref 13.5–17.5)
LYMPHOCYTES ABSOLUTE: 0.7 K/UL (ref 1–5.1)
LYMPHOCYTES RELATIVE PERCENT: 21.2 %
MCH RBC QN AUTO: 35.7 PG (ref 26–34)
MCHC RBC AUTO-ENTMCNC: 34.2 G/DL (ref 31–36)
MCV RBC AUTO: 104.5 FL (ref 80–100)
MONOCYTES ABSOLUTE: 0.4 K/UL (ref 0–1.3)
MONOCYTES RELATIVE PERCENT: 11.6 %
MRSA SCREEN RT-PCR: ABNORMAL
NEUTROPHILS ABSOLUTE: 2 K/UL (ref 1.7–7.7)
NEUTROPHILS RELATIVE PERCENT: 63.2 %
ORGANISM: ABNORMAL
PDW BLD-RTO: 15.1 % (ref 12.4–15.4)
PLATELET # BLD: 87 K/UL (ref 135–450)
PMV BLD AUTO: 9.9 FL (ref 5–10.5)
POTASSIUM REFLEX MAGNESIUM: 3.7 MMOL/L (ref 3.5–5.1)
POTASSIUM REFLEX MAGNESIUM: 4.2 MMOL/L (ref 3.5–5.1)
POTASSIUM REFLEX MAGNESIUM: 4.4 MMOL/L (ref 3.5–5.1)
RBC # BLD: 2.45 M/UL (ref 4.2–5.9)
SODIUM BLD-SCNC: 128 MMOL/L (ref 136–145)
SODIUM BLD-SCNC: 131 MMOL/L (ref 136–145)
SODIUM BLD-SCNC: 135 MMOL/L (ref 136–145)
TOTAL PROTEIN: 5.3 G/DL (ref 6.4–8.2)
URINE CULTURE, ROUTINE: ABNORMAL
URINE CULTURE, ROUTINE: ABNORMAL
VANCOMYCIN RANDOM: 16.9 UG/ML
WBC # BLD: 3.1 K/UL (ref 4–11)

## 2022-09-13 PROCEDURE — 6360000002 HC RX W HCPCS: Performed by: INTERNAL MEDICINE

## 2022-09-13 PROCEDURE — 05HY33Z INSERTION OF INFUSION DEVICE INTO UPPER VEIN, PERCUTANEOUS APPROACH: ICD-10-PCS | Performed by: INTERNAL MEDICINE

## 2022-09-13 PROCEDURE — 80202 ASSAY OF VANCOMYCIN: CPT

## 2022-09-13 PROCEDURE — 6360000002 HC RX W HCPCS

## 2022-09-13 PROCEDURE — 97166 OT EVAL MOD COMPLEX 45 MIN: CPT

## 2022-09-13 PROCEDURE — 36569 INSJ PICC 5 YR+ W/O IMAGING: CPT

## 2022-09-13 PROCEDURE — 85025 COMPLETE CBC W/AUTO DIFF WBC: CPT

## 2022-09-13 PROCEDURE — 80076 HEPATIC FUNCTION PANEL: CPT

## 2022-09-13 PROCEDURE — 1200000000 HC SEMI PRIVATE

## 2022-09-13 PROCEDURE — 2580000003 HC RX 258: Performed by: INTERNAL MEDICINE

## 2022-09-13 PROCEDURE — C1751 CATH, INF, PER/CENT/MIDLINE: HCPCS

## 2022-09-13 PROCEDURE — 99223 1ST HOSP IP/OBS HIGH 75: CPT | Performed by: INTERNAL MEDICINE

## 2022-09-13 PROCEDURE — 80048 BASIC METABOLIC PNL TOTAL CA: CPT

## 2022-09-13 PROCEDURE — 97161 PT EVAL LOW COMPLEX 20 MIN: CPT

## 2022-09-13 PROCEDURE — 97535 SELF CARE MNGMENT TRAINING: CPT

## 2022-09-13 PROCEDURE — 36415 COLL VENOUS BLD VENIPUNCTURE: CPT

## 2022-09-13 PROCEDURE — 97530 THERAPEUTIC ACTIVITIES: CPT

## 2022-09-13 PROCEDURE — 2580000003 HC RX 258

## 2022-09-13 PROCEDURE — 87040 BLOOD CULTURE FOR BACTERIA: CPT

## 2022-09-13 PROCEDURE — 6370000000 HC RX 637 (ALT 250 FOR IP)

## 2022-09-13 PROCEDURE — 2500000003 HC RX 250 WO HCPCS

## 2022-09-13 RX ORDER — SODIUM CHLORIDE 0.9 % (FLUSH) 0.9 %
5-40 SYRINGE (ML) INJECTION EVERY 12 HOURS SCHEDULED
Status: DISCONTINUED | OUTPATIENT
Start: 2022-09-13 | End: 2022-09-16 | Stop reason: HOSPADM

## 2022-09-13 RX ORDER — SODIUM CHLORIDE 9 MG/ML
25 INJECTION, SOLUTION INTRAVENOUS PRN
Status: DISCONTINUED | OUTPATIENT
Start: 2022-09-13 | End: 2022-09-16 | Stop reason: HOSPADM

## 2022-09-13 RX ORDER — LIDOCAINE HYDROCHLORIDE 10 MG/ML
5 INJECTION, SOLUTION EPIDURAL; INFILTRATION; INTRACAUDAL; PERINEURAL ONCE
Status: COMPLETED | OUTPATIENT
Start: 2022-09-13 | End: 2022-09-13

## 2022-09-13 RX ORDER — GUAIFENESIN/DEXTROMETHORPHAN 100-10MG/5
5 SYRUP ORAL EVERY 4 HOURS PRN
Status: DISCONTINUED | OUTPATIENT
Start: 2022-09-13 | End: 2022-09-16 | Stop reason: HOSPADM

## 2022-09-13 RX ORDER — SODIUM CHLORIDE 0.9 % (FLUSH) 0.9 %
5-40 SYRINGE (ML) INJECTION PRN
Status: DISCONTINUED | OUTPATIENT
Start: 2022-09-13 | End: 2022-09-16 | Stop reason: HOSPADM

## 2022-09-13 RX ADMIN — MEROPENEM 1000 MG: 1 INJECTION, POWDER, FOR SOLUTION INTRAVENOUS at 11:07

## 2022-09-13 RX ADMIN — PANTOPRAZOLE SODIUM 40 MG: 40 TABLET, DELAYED RELEASE ORAL at 05:35

## 2022-09-13 RX ADMIN — SODIUM CHLORIDE: 9 INJECTION, SOLUTION INTRAVENOUS at 17:39

## 2022-09-13 RX ADMIN — MEROPENEM 1000 MG: 1 INJECTION, POWDER, FOR SOLUTION INTRAVENOUS at 02:00

## 2022-09-13 RX ADMIN — GABAPENTIN 300 MG: 300 CAPSULE ORAL at 22:00

## 2022-09-13 RX ADMIN — LIDOCAINE HYDROCHLORIDE 5 ML: 10 INJECTION, SOLUTION EPIDURAL; INFILTRATION; INTRACAUDAL; PERINEURAL at 13:57

## 2022-09-13 RX ADMIN — BENZONATATE 100 MG: 100 CAPSULE ORAL at 22:00

## 2022-09-13 RX ADMIN — TAMSULOSIN HYDROCHLORIDE 0.4 MG: 0.4 CAPSULE ORAL at 10:59

## 2022-09-13 RX ADMIN — SODIUM CHLORIDE, PRESERVATIVE FREE 10 ML: 5 INJECTION INTRAVENOUS at 22:01

## 2022-09-13 RX ADMIN — GABAPENTIN 300 MG: 300 CAPSULE ORAL at 10:59

## 2022-09-13 RX ADMIN — RIFAXIMIN 550 MG: 550 TABLET ORAL at 11:00

## 2022-09-13 RX ADMIN — DIPHENHYDRAMINE HYDROCHLORIDE 25 MG: 25 TABLET ORAL at 22:00

## 2022-09-13 RX ADMIN — MEROPENEM 1000 MG: 1 INJECTION, POWDER, FOR SOLUTION INTRAVENOUS at 17:41

## 2022-09-13 RX ADMIN — RIFAXIMIN 550 MG: 550 TABLET ORAL at 21:59

## 2022-09-13 RX ADMIN — VANCOMYCIN HYDROCHLORIDE 1000 MG: 10 INJECTION, POWDER, LYOPHILIZED, FOR SOLUTION INTRAVENOUS at 08:20

## 2022-09-13 RX ADMIN — SODIUM CHLORIDE, PRESERVATIVE FREE 10 ML: 5 INJECTION INTRAVENOUS at 11:00

## 2022-09-13 ASSESSMENT — ENCOUNTER SYMPTOMS
DIARRHEA: 0
NAUSEA: 0
VOMITING: 0
ABDOMINAL PAIN: 0
COUGH: 1
SHORTNESS OF BREATH: 1

## 2022-09-13 ASSESSMENT — PAIN SCALES - GENERAL
PAINLEVEL_OUTOF10: 0
PAINLEVEL_OUTOF10: 8
PAINLEVEL_OUTOF10: 0

## 2022-09-13 ASSESSMENT — PAIN DESCRIPTION - DESCRIPTORS: DESCRIPTORS: ACHING

## 2022-09-13 ASSESSMENT — PAIN DESCRIPTION - LOCATION: LOCATION: FLANK

## 2022-09-13 ASSESSMENT — PAIN DESCRIPTION - ONSET: ONSET: ON-GOING

## 2022-09-13 ASSESSMENT — PAIN DESCRIPTION - ORIENTATION: ORIENTATION: RIGHT

## 2022-09-13 ASSESSMENT — PAIN DESCRIPTION - FREQUENCY: FREQUENCY: INTERMITTENT

## 2022-09-13 ASSESSMENT — PAIN DESCRIPTION - PAIN TYPE: TYPE: ACUTE PAIN

## 2022-09-13 NOTE — PROGRESS NOTES
Clinical Pharmacy Progress Note    Vancomycin - Management by Pharmacy    Consult Date(s): 9/10/22  Consulting Provider(s): Jannie    Assessment / Plan    Sepsis - Vancomycin  Concurrent Antimicrobials: Merrem (Day #2)  Day of Vanc Therapy / Ordered Duration: Day # 4/7  Current Dosing Method: Bayesian-Guided AUC Dosing  Therapeutic Goal: 400-600 mg/L*hr  Current Dose / Frequency: 1000 mg every 12 hours  Plan / Rationale:   SCr stable this morning at 0.6 mg/dL. Random level drawn this am and resulted in trough level 16.9  Current regimen predicts an AUC and trough of 523 mg/L.hr and 17.1 mg/L respectively. Will continue current regimen and get a random level in ~48 hours based on patient's clinical status and clinician's judgement   Will continue to monitor clinical condition and make adjustments to regimen as appropriate. Please call with any questions. Thank you,    Marcelo Rodriguez) Tavia Johnson, PharmD  PGY1 Pharmacy Resident  The [address] [place] [address] St: [phone]          Interval update: Patient had tmax of 98.5 overnight, blood Cx resulted in ESBL e. Coli - patient switched from cefepime to merrem, liver panel has improved since admission, WBC trending down, plt trending down CTM    Subjective/Objective: Mr. Tonja Vazquez is a 79 y.o. male with a PMHx significant for alcoholic cirrhosis, presented from [facility] with SOB x3 days. Patient was diagnosed with COVID on 8/29. Patient admitted for sepsis 2/2 COVID-19 infection. Pharmacy has been consulted to dose vancomycin.     Ht Readings from Last 1 Encounters:   09/10/22 5' 9\" (1.753 m)     Wt Readings from Last 1 Encounters:   09/12/22 213 lb 13.5 oz (97 kg)       Current & Prior Antimicrobial Regimen(s):  Vancomycin 1000 mg IV q12h (9/10-Current)  Merrem 1000 mg q8H (9/11-current)    Level(s) / Doses:    Date Time Dose Level / Type of Level Interpretation   9/11 0421 1000 mg q12h Random= 6.4 mg/L -Drawn ~12 hr after 1st dose  -Estimated AUC 523  -Continue regimen   9/13 0441 1000 mg q12H Random = 16.9 mg/L -drawn ~8 hr after 5th dose  -Estimated   -Continue regimen    Note: Serum levels collected for AUC-based dosing may be high if collected in close proximity to the dose administered. This is not necessarily indicative of toxicity. Cultures & Sensitivities:    Date Site Micro Susceptibility / Result   9/10 Blood Cx x2 ESBL e. Coli Ertapenam sensitive    9/10 Urine Cx ESBL e. Coli Ertapenam sensitive    9/10 Covid 19 Positive    9/12 MRSA Nare Positive       Labs / Ancillary Data:    Estimated Creatinine Clearance: 137 mL/min (A) (based on SCr of 0.6 mg/dL (L)). Recent Labs     09/11/22  0421 09/11/22  2155 09/12/22  0424 09/12/22  0909 09/12/22  2201 09/13/22  0441 09/13/22  0805   CREATININE 0.9   < > 0.7*   < > 0.6* 0.6* 0.6*   BUN 12   < > 12   < > 10 8 7   WBC 7.6  --  4.9  --   --  3.1*  --     < > = values in this interval not displayed. Additional Lab Values / Findings of Note:    Procalcitonin: No results for input(s): PROCAL in the last 72 hours.

## 2022-09-13 NOTE — PROGRESS NOTES
Occupational Therapy  Facility/Department: [place] [address] [facility]  Occupational Therapy Initial Assessment    Name: Milo Garcia  : 1955  MRN: [id number]  Date of Service: 2022    Discharge Recommendations: Milo Garcia scored a 15/24 on the AM-PAC ADL Inpatient form. Current research shows that an AM-PAC score of 17 or less is typically not associated with a discharge to the patient's home setting. Based on the patient's AM-PAC score and their current ADL deficits, it is recommended that the patient have 3-5 sessions per week of Occupational Therapy at d/c to increase the patient's independence. Please see assessment section for further patient specific details. If patient discharges prior to next session this note will serve as a discharge summary. Please see below for the latest assessment towards goals. OT Equipment Recommendations  Equipment Needed: No  Other: defer       Patient Diagnosis(es): The primary encounter diagnosis was Sepsis, due to unspecified organism, unspecified whether acute organ dysfunction present ([address] Diagnoses of Alcoholic cirrhosis of liver without ascites ([address] Hypomagnesemia, and COVID were also pertinent to this visit. Past Medical History:  has a past medical history of Alcoholic cirrhosis of liver ([address] Alcoholic polyneuropathy ([address] Anemia of chronic disorder, COVID-19, Esophageal reflux, and Essential hypertension, benign. Past Surgical History:  has no past surgical history on file. Treatment Diagnosis: decreased independence with ADLs and fx mobility, decreased endurance      Assessment   Performance deficits / Impairments: Decreased functional mobility ; Decreased ADL status; Decreased strength;Decreased balance;Decreased endurance;Decreased cognition  Assessment: Pt is performing below his functional baseline and has since he was admitted to [facility] and sent to  around  per chart review.  Pt reports he typically primarily independent with most ADLs and all fx mobility at baseline. Pt now required mod A for bed mobility and min A for transfers. Pt requires max A for episode of BM incontinence during session and for LB dressing. Pt exhibits decreased strength and endurance. Pt would benefit from ongoing inpatient therapy services at discharge. Will cont to follow on acute OT POC. Treatment Diagnosis: decreased independence with ADLs and fx mobility, decreased endurance  Prognosis: Good  Decision Making: Medium Complexity  REQUIRES OT FOLLOW-UP: Yes  Activity Tolerance  Activity Tolerance: Patient Tolerated treatment well;Patient limited by fatigue  Activity Tolerance Comments: fatigued at end of session; has coughing fits at times; pt motivated to continue improving        Plan   Plan  Times per Week: 2-5x  Current Treatment Recommendations: Strengthening, Balance training, Functional mobility training, Safety education & training, Self-Care / ADL, Endurance training     Restrictions  Position Activity Restriction  Other position/activity restrictions: up with asssitance ; Droplet plus    Subjective   General  Chart Reviewed: Yes  Patient assessed for rehabilitation services?: Yes  Additional Pertinent Hx: 79 y.o. male with PMH of alcoholic liver cirrhosis, GERD, hyponatremia who presents to the ED from [address] [facility] with shortness of breath and chills. Patient reported he has been feeling sick for the past couple of days was diagnosed with COVID on 8/29/2022. Referring Practitioner: Iam Noe MD  Diagnosis: COVID-19  Subjective  Subjective: Pt lying supine in bed upon OT arrival and agreeable to OT evaluation. Pt stating, \"I can't stand for long or walk for long\". Pt joking throughout session and in good spirits. Pt has instances of coughing fits.   denies  Social/Functional History  Social/Functional History  Lives With: Alone  Type of Home: Apartment  Home Layout: One level  Home Access: Elevator, Level entry  [place] Shower/Tub: Tub/Shower unit (however pt typically bathes at the sink)  Bathroom Toilet: Standard  Bathroom Accessibility: Accessible  Home Equipment: Alert Button, Walker, rolling, Wheelchair-manual  Has the patient had two or more falls in the past year or any fall with injury in the past year?: Yes (\"I had a fall at home\"- sideways and hit the floor and was on the floor for 19 hrs)  Receives Help From: Home health (home health aid comes 2x a week)  ADL Assistance: [address] [place] [address] Avenue: Needs assistance  Laundry: Stand by  Cleaning:  (home health aid)  Shopping:  (home health aid performs)  Homemaking Responsibilities: Yes  Meal Prep Responsibility: Primary  Laundry Responsibility: Primary  Bill Paying/Finance Responsibility: Primary  Ambulation Assistance: Independent (with 2WW)  Transfer Assistance: Independent  Active : No  Patient's  Info: \" I don't drive \"  Mode of Transportation: Car  Leisure & Hobbies: Watch TV and do [employer]  Additional Comments: Pt does not have 24hr or family/friends in the area; Prior to hosptial admission: Pt was at [facility] who reported the patient was admitted at the facility on 9/2/2022 after he was discharged from the [facility].  Patient was admitted to the [facility] for hyponatremia and was discharged with lactulose and rifaximin. Objective   Heart Rate: (!) 104  Heart Rate Source: Monitor  BP: (!) 152/86  BP Location: Right upper arm  BP Method: Automatic  Patient Position: Semi fowlers  MAP (Calculated): 108  Resp: 20  SpO2: 100 %  O2 Device: None (Room air)             Safety Devices  Type of Devices: Call light within reach; Chair alarm in place;Nurse notified; Left in chair;Gait belt  Bed Mobility Training  Bed Mobility Training: Yes  Supine to Sit: Moderate assistance (HOB elevated)  Scooting: Contact-guard assistance (seated)  Balance  Sitting: Intact  Sitting - Static: Good (unsupported)  Sitting - Dynamic: Fair safety  Insights: Decreased awareness of deficits  Initiation: Requires cues for some  Sequencing: Requires cues for some  Orientation  Overall Orientation Status: Within Normal Limits  Orientation Level: Oriented X4                  Education Given To: Patient  Education Provided: Role of Therapy;Plan of Care;Transfer Training  Education Method: Demonstration  Barriers to Learning: Cognition  Education Outcome: Verbalized understanding;Continued education needed                        G-Code     OutComes Score                                                  AM-PAC Score        AM-PAC Inpatient Daily Activity Raw Score: 15 (09/13/22 1304)  AM-PAC Inpatient ADL T-Scale Score : 34.69 (09/13/22 1304)  ADL Inpatient CMS 0-100% Score: 56.46 (09/13/22 1304)  ADL Inpatient CMS G-Code Modifier : CK (09/13/22 1304)           Goals  Short Term Goals  Time Frame for Short term goals: by discharge  Short Term Goal 1: Pt will perform toilet transfer with CGA  Short Term Goal 2: Pt will perform standing endurance for ADLs for 5 min with CGA  Short Term Goal 3: Pt will perform LB dressing with AE as needed and min A  Short Term Goal 4: Pt will participate in rear isidra care with VC and CGA  Patient Goals   Patient goals : to go somewhere different for rehab       Therapy Time   Individual Concurrent Group Co-treatment   Time In 1000         Time Out 1038         Minutes 38         Timed Code Treatment Minutes: 23 Minutes (+ 15 min OT yue)       Cristina Mcmullen OT

## 2022-09-13 NOTE — DISCHARGE INSTR - COC
Continuity of Care Form    Patient Name: Milo Garcia   :  1955  MRN:  [id number]    Admit date:  9/10/2022  Discharge date:  09/15/2022    Code Status Order: Full Code   Advance Directives:     Admitting Physician:  Nitish Burt MD  PCP: No primary care provider on file. Discharging Nurse: [facility] Unit/Room#: 4742/8121-91  Discharging Unit Phone Number: [phone]    Emergency Contact:   Extended Emergency Contact Information  Primary Emergency Contact: [address]  Mobile Phone: [phone]  Relation: Brother/Sister   needed? No    Past Surgical History:  History reviewed. No pertinent surgical history. Immunization History: There is no immunization history on file for this patient.     Active Problems:  Patient Active Problem List   Diagnosis Code    Septic shock ([facility] A41.9, R65.21    UTI due to extended-spectrum beta lactamase (ESBL) producing Escherichia coli N39.0, B96.29, Z16.12    Infection due to ESBL-producing Escherichia coli A49.8, H17.20    Alcoholic cirrhosis of liver without ascites (HCC) K70.30       Isolation/Infection:   Isolation            Droplet Plus  Contact          Patient Infection Status       Infection Onset Added Last Indicated Last Indicated By Review Planned Expiration Resolved Resolved By    MDRO (multi-drug resistant organism) 09/10/22 09/13/22 09/13/22 Oscar Flood RN        urine    MRSA 22 MRSA DNA Probe, Nasal        ESBL (Extended Spectrum Beta Lactamase) 09/10/22 09/12/22 09/10/22 Culture, Urine        Procedure Component Value Units Date/Time  Culture, Blood 2 [[id number]] (Abnormal)   Collected: 09/10/22 1404  Lab Status: Preliminary result Specimen: Blood   Updated: 22   Culture, Blood 2 -- Abnormal          Organism Escherichia coli ESBL DNA Detected Abnormal         COVID-19 09/10/22 09/10/22 09/10/22 COVID-19 & Influenza Combo 22      Resolved    COVID-19 (Rule Out) 09/10/22 09/10/22 09/10/22 COVID-19 & Influenza Combo (Ordered)   09/10/22 Rule-Out Test Resulted            Nurse Assessment:  Last Vital Signs: /76   Pulse 91   Temp 98.3 °F (36.8 °C) (Oral)   Resp 20   Ht 5' 9\" (1.753 m)   Wt 213 lb 13.5 oz (97 kg)   SpO2 98%   BMI 31.58 kg/m²     Last documented pain score (0-10 scale): Pain Level: 0  Last Weight:   Wt Readings from Last 1 Encounters:   09/12/22 213 lb 13.5 oz (97 kg)     Mental Status:  oriented, alert, and thought processes intact    IV Access:  - PICC - site  L Upper Arm, insertion date: 09/13/2022    Nursing Mobility/ADLs:  Walking   Assisted  Transfer  Assisted  Bathing  Assisted  Dressing  Assisted  Toileting  Assisted  Feeding  Independent  Med Admin  Independent  Med Delivery   whole    Wound Care Documentation and Therapy:  Wound 09/10/22 Perineum (Active)   Wound Etiology Pressure Stage 2 09/13/22 1552   Wound Cleansed Wound cleanser 09/13/22 1552   Dressing/Treatment Open to air 09/13/22 1552   Drainage Amount Scant 09/13/22 1552   Drainage Description Serosanguinous 09/12/22 1751   Odor None 09/13/22 1552   Margins Defined edges 09/12/22 1751   Number of days: 3        Elimination:  Continence: Bowel: No  Bladder: No  Urinary Catheter: None   Colostomy/Ileostomy/Ileal Conduit: No       Date of Last BM: 09/15/2022    Intake/Output Summary (Last 24 hours) at 9/13/2022 1716  Last data filed at 9/13/2022 1556  Gross per 24 hour   Intake 970 ml   Output 0 ml   Net 970 ml     I/O last 3 completed shifts: In: 3089.3 [P.O.:120; I.V.:2719.3; IV Piggyback:250]  Out: 0     Safety Concerns: At Risk for Falls    Impairments/Disabilities:      None    Nutrition Therapy:  Current Nutrition Therapy:   - Oral Diet:  General  - Oral Nutrition Supplement:  Low Calorie High Protein  three times a day    Routes of Feeding: Oral  Liquids:  Thin Liquids  Daily Fluid Restriction: no  Last Modified Barium Swallow with Video (Video Swallowing Test): not done    Treatments at the Time of Hospital Discharge:   Respiratory Treatments: none    Oxygen Therapy:  is not on home oxygen therapy. Ventilator:    - No ventilator support    Rehab Therapies: Physical Therapy and Occupational Therapy  Weight Bearing Status/Restrictions: No weight bearing restrictions  Other Medical Equipment (for information only, NOT a DME order):  walker  Other Treatments: ***    Patient's personal belongings (please select all that are sent with patient):  None    RN SIGNATURE:  Electronically signed by Rilla Bloch, RN on 9/15/22 at 3:16 PM EDT    CASE MANAGEMENT/SOCIAL WORK SECTION    Inpatient Status Date: 09/10/2022    Readmission Risk Assessment Score:  Readmission Risk              Risk of Unplanned Readmission:  17           Discharging to Facility/ Agency        Wade Wallace  Address: [address] [place] [address], [address] [facility]  Phone: [phone]     FAx:  [phone]    Dialysis Facility (if applicable)     NA    Name:  Address:  Dialysis Schedule:  Phone:  Fax:    / signature: {Esignature:[id number]}    PHYSICIAN SECTION    Prognosis: {Prognosis:[id number]}    Condition at Discharge: Kaylen Conn Devin Patient Condition:[id number]}    Rehab Potential (if transferring to Rehab):  Fair    Recommended Labs or Other Treatments After Discharge:   INFUSION ORDERS:  Invanz 1 gm iv daily through 9/23  - To receive first dose in hospital  - Diagnosis - E coli UTI / bacteremia  - L PICC  - placed 9/13  - Disposition / date discharge - [facility] CBC w diff, CMP, ESR, CRP every Mon or Tue - FAX result to [phone]  - Call with antibiotic / infusion issues, [phone]  - Call with any change in status, transfer in or out of a facility or to hospital - [phone]  - No f/u in outpatient ID office necessary    Physician Certification: I certify the above information and transfer of Tonja Vazquez  is necessary for the continuing treatment of the diagnosis listed and that he requires [facility] for less 30 days.      Update Admission H&P: No change in H&P    PHYSICIAN SIGNATURE:  Electronically signed by Anna Marie Emmanuel MD on 9/14/22 at 7:35 AM EDT

## 2022-09-13 NOTE — PROGRESS NOTES
Comprehensive Nutrition Assessment    Type and Reason for Visit:  Initial, Wound, Consult    Nutrition Recommendations/Plan:   May need to liberalize diet if directly affecting po intake  Offer Ensure high protein with meals   Will monitor nutritional adequacy, nutrition-related labs, weights, BMs, and clinical progress       Malnutrition Assessment:  Malnutrition Status: At risk for malnutrition (Comment) (09/13/22 1921)    Context:          Nutrition Assessment:    Patient admitted with septic shock with recent diagnosis of COVID late August per progress notes. Currently on a low sodium diet, po intake decreased today. Patient attempted to answer my questions over the phone but was coughing consistently. Will add nutrition supplements, Ensure high protein and monitor tolerance,    Nutrition Related Findings:    BM x 1 on 9/12/22; Na 128 mmol/L         Current Nutrition Intake & Therapies:    Average Meal Intake: 0%     ADULT DIET; Regular; Low Sodium (2 gm)  ADULT ORAL NUTRITION SUPPLEMENT; Breakfast, Lunch, Dinner; Standard High Calorie/High Protein Oral Supplement    Anthropometric Measures:  Height: 5' 9\" (175.3 cm)  Ideal Body Weight (IBW): 160 lbs (73 kg)       Current Body Weight: 213 lb 13 oz (97 kg),   IBW. Weight Source: Bed Scale  Current BMI (kg/m2): 31.6                          BMI Categories: Obese Class 1 (BMI 30.0-34. 9)    Nutrition Diagnosis:   Increased nutrient needs related to impaired respiratory function, increase demand for energy/nutrients as evidenced by wounds, poor intake prior to admission, weight loss    Nutrition Interventions:   Food and/or Nutrient Delivery: Continue Current Diet, Start Oral Nutrition Supplement     Coordination of Nutrition Care: Continue to monitor while inpatient       Goals:     Goals: PO intake 75% or greater       Nutrition Monitoring and Evaluation:      Food/Nutrient Intake Outcomes: Food and Nutrient Intake, Supplement Intake  Physical Signs/Symptoms Outcomes: Nutrition Focused Physical Findings, Biochemical Data (coughing)    Discharge Planning:     Too soon to determine     ZULEYKA, [address] [facility], [address] [place] [address],   Contact: [phone]

## 2022-09-13 NOTE — CONSULTS
Clinical Pharmacy Progress Note    Vancomycin has been discontinued. Pharmacy will sign off. Please re-consult pharmacy if vancomycin dosing is wanted in the future. Please call with questions.   Thor Duverney, PharmD  Main Pharmacy: [id number]  9/13/2022 3:28 PM

## 2022-09-13 NOTE — CONSULTS
Infectious Diseases Inpatient Consult Note    RESIDENT NOTE - reviewed / edited, attending note at bottom    Reason for Consult:   ESBL E coli UTI and bacteremia  Requesting Physician:   Dr. Miguel Connelly  Primary Care Physician:  No primary care provider on file. History Obtained From:   Pt, EPIC    Admit Date: 9/10/2022  Hospital Day: 4    CHIEF COMPLAINT:       Chief Complaint   Patient presents with    Shortness of Breath     From ECF with shortness of breath that started 3 days ago. Pt diagnosed with covid on 8/29. 's on arrival       HISTORY OF PRESENT ILLNESS:      Heron Ibarra is a 79year old male with PMHx significant for GERD, hypertension, alcoholic liver cirrhosis, and recent COVID infection (8/29), who presented to the [place] [address] on 9/10 for shortness of breath and generalized weakness. Patient also notes he was increasingly jaundiced. In the ED, the patient was tachycardic, tachypneic, and mildly hypotensive, though afebrile. He was found to be hyponatremic, had lactic acidosis of 4.1, and hyperbilirubinemia 1.4 with INR 1.43. He did not have leukocytosis at that time. CT chest/abdomen/pelvis showed fatty liver with splenomegaly and bladder wall thickening. Given fluids, started on oxygen. UA was performed, which showed greater than 100 WBCs, 4+ bacteria, nitrites. The patient was started on vancomycin and cefepime    On admission, urine cultures returned with ESBL E coli. Cefepime was stopped and the patient was started on Merrem per pharmacy, vancomycin was continued. The patient had T-max of 100.3 on 9/12. Currently, the patient has been tachycardic since this morning with stable pressures. He has been off of oxygen supplementation. He states he feels a little better, but complaining of some intermittent nausea without vomiting. Also complaining of some right upper quadrant abdominal pain. Denies fever or chills.     Past Medical History:    Past Medical History: Diagnosis Date    Alcoholic cirrhosis of liver (HCC)     Alcoholic polyneuropathy (HCC)     Anemia of chronic disorder     COVID-19     Esophageal reflux     Essential hypertension, benign        Past Surgical History:    History reviewed. No pertinent surgical history. Current Medications:     lidocaine 1 % injection  5 mL IntraDERmal Once    sodium chloride flush  5-40 mL IntraVENous 2 times per day    meropenem  1,000 mg IntraVENous Q8H    sodium chloride flush  5-40 mL IntraVENous 2 times per day    [Held by provider] enoxaparin  30 mg SubCUTAneous BID    vancomycin  1,000 mg IntraVENous Q12H    gabapentin  300 mg Oral BID    [Held by provider] lactulose  20 g Oral TID    midodrine  5 mg Oral TID WC    pantoprazole  40 mg Oral QAM AC    tamsulosin  0.4 mg Oral Daily    rifAXIMin  550 mg Oral BID       Allergies:  Ceftriaxone    Social History:    TOBACCO:    Former smoker, quit about 40 years ago  ETOH:    Former heavy drinker, quit 9 months ago  DRUGS:   Denies  MARITAL STATUS:   Single  OCCUPATION:   Retired    Patient lives at home, came from a nursing facility    Family History:   No immunodeficiency    REVIEW OF SYSTEMS:    No fever / chills / sweats. No weight loss. No visual change, eye pain, eye discharge. No oral lesion, sore throat, dysphagia. Denies cough / sputum. Denies chest pain, palpitations. Denies n / v / abd pain. No diarrhea. Denies dysuria or change in urinary function. Denies joint swelling or pain. No myalgia, arthralgia. Denies skin changes, itching  Denies focal weakness, sensory change or other neurologic symptom    Denies new / worse depression, psychiatric symptoms    PHYSICAL EXAM:      Vitals:    BP (!) 152/86   Pulse (!) 104   Temp 98.1 °F (36.7 °C) (Oral)   Resp 20   Ht 5' 9\" (1.753 m)   Wt 213 lb 13.5 oz (97 kg)   SpO2 100%   BMI 31.58 kg/m²     GENERAL: No apparent distress.   Jaundiced  HEENT: Membranes moist, no oral lesion, PERRL, scleral icterus NECK:  Supple, no lymphadenopathy  LUNGS: Clear b/l, no rales, no dullness  CARDIAC: RRR, no murmur appreciated  ABD:  + BS, soft / hepatomegaly with tenderness, rotund abdomen   EXT:  No rash, no edema, no lesions - no suprapubic tenderness, R flank pain  NEURO: No focal neurologic findings  PSYCH: Orientation, sensorium, mood normal  LINES:  Peripheral iv    DATA:    Lab Results   Component Value Date    WBC 3.1 (L) 09/13/2022    HGB 8.7 (L) 09/13/2022    HCT 25.6 (L) 09/13/2022    .5 (H) 09/13/2022    PLT 87 (L) 09/13/2022     Lab Results   Component Value Date    CREATININE 0.6 (L) 09/13/2022    BUN 7 09/13/2022     (L) 09/13/2022    K 4.2 09/13/2022     09/13/2022    CO2 19 (L) 09/13/2022       Hepatic Function Panel:   Lab Results   Component Value Date/Time    ALKPHOS 253 09/13/2022 04:41 AM    ALT 25 09/13/2022 04:41 AM    AST 86 09/13/2022 04:41 AM    PROT 5.3 09/13/2022 04:41 AM    BILITOT 5.3 09/13/2022 04:41 AM    BILIDIR 3.5 09/13/2022 04:41 AM    IBILI 1.8 09/13/2022 04:41 AM    LABALBU 2.3 09/13/2022 04:41 AM       Micro:  Urine culture taken 9/10 growing ESBL  Blood cultures taken 9/10 also growing ESBL   -Resistant to ampicillin, cefepime, ceftriaxone, Cipro, levo, Bactrim  -Sensitive to Unasyn with WILFRIDO of 8, sensitive to ertapenem and gentamicin    Imaging:   CTA chest/abd/pel (9/10): Incidental findings include fatty liver, borderline in size and moderate splenomegaly       Cholelithiasis with trace free fluid in the abdomen along left paracolic gutter of uncertain etiology       Scattered diverticulosis but no sign of any acute or active diverticulitis or obstruction       Bladder wall thickening appreciated.        Fat-containing inguinal hernias noted bilaterally       IMPRESSION:      Patient Active Problem List   Diagnosis    Septic shock ([address]       #Urosepsis secondary to ESBL E coli acute cystitis with ESBL bacteremia  -On cefepime from 9/10-9/11, switched to meropenem on 11/9 to present; on vancomycin from 9/10 to present  -No leukocytosis ever  -Lactic acidosis improved from 4.1 on admission to 1.8 on 9/11    #Decompensated cirrhosis of the liver  -Started on rifaximin on 9/10  -Bilirubin has decreased from 9.4 on admission to 6.2 to 5.3 this morning      RECOMMENDATIONS:    -Given cultures and sensitivities, recommend de-escalating vancomycin, continuing meropenem  -ESBL bacteremia and presence of liver cirrhosis, recommend a 14-day course of carbapenems   -If the patient is appropriate for discharge prior to completion of antibiotic course, the patient has a PICC line     Discussed with Dr. Veronica Vasquez, DO    Addendum to Resident Consult note:  Pt seen, examined and evaluated. I have reviewed the current history, physical findings, labs and assessment and plan and agree with note as documented by resident (Dr. Simon Palma). 78 yo man with hx cirrhosis, HTN, MERYL  Dx COVID-19 8/29    Presents with SOB, weakness    In ED 9/10, afeb, BP lowest 97/58, WBC 7.6  UA mod LE, micro >100 WBC/HPF  CT with bladder wall thickening  Admit, started on     Today 9/13 - feels better 'not 100%'    IMP/  Chronically ill  E coli ESBL UTI / bacteremia    REC/  Cont Meropenem  D/c Vancomycin    Given presentation and positive BC, will give course, iv carbapenem   Will use ertapenem, treat for 10 days   See LOREN    Medical Decision Making: The following items were considered in medical decision making:  Discussion of patient care with other providers  Reviewed clinical lab tests  Reviewed radiology tests  Reviewed other diagnostic tests/interventions  Microbiology cultures and other micro tests reviewed      Risk of Complications/Morbidity: High   Illness(es)/ Infection present that pose threat to bodily function. There is potential for severe exacerbation of infection/side effects of treatment.   Therapy requires intensive monitoring for antimicrobial agent toxicity    Discussed with pt  Dorita Horne MD    INFUSION ORDERS:  Invanz 1 gm iv daily through 9/23  - Diagnosis - E coli UTI / bacteremia  - First dose given in hospital  - L PICC  - placed 9/13  - Disposition / date discharge - [facility] CBC w diff, CMP, ESR, CRP every Mon or [address] result to [phone]  - Call with antibiotic / infusion issues, [phone]  - Call with any change in status, transfer in or out of a facility or to hospital - [phone]  - No f/u in outpatient ID office necessary

## 2022-09-13 NOTE — PROGRESS NOTES
Physical Therapy  Facility/Department: [place] [address] [facility]  Physical Therapy Initial Assessment/Treatment    Name: Amanda Contreras  : 1955  MRN: [id number]  Date of Service: 2022    Discharge Recommendations:  Amanda Contreras scored a 14/24 on the AM-PAC short mobility form. Current research shows that an AM-PAC score of 17 or less is typically not associated with a discharge to the patient's home setting. Based on the patient's AM-PAC score and their current functional mobility deficits, it is recommended that the patient have 3-5 sessions per week of Physical Therapy at d/c to increase the patient's independence. Please see assessment section for further patient specific details. If patient discharges prior to next session this note will serve as a discharge summary. Please see below for the latest assessment towards goals. Patient would benefit from continued therapy after discharge   PT Equipment Recommendations  Equipment Needed:  (defer)      Patient Diagnosis(es): The primary encounter diagnosis was Sepsis, due to unspecified organism, unspecified whether acute organ dysfunction present ([address] Diagnoses of Alcoholic cirrhosis of liver without ascites ([address] Hypomagnesemia, and COVID were also pertinent to this visit. Past Medical History:  has a past medical history of Alcoholic cirrhosis of liver ([address] Alcoholic polyneuropathy ([address] Anemia of chronic disorder, COVID-19, Esophageal reflux, and Essential hypertension, benign. Past Surgical History:  has no past surgical history on file. Assessment   Body Structures, Functions, Activity Limitations Requiring Skilled Therapeutic Intervention: Decreased functional mobility   Assessment: Pt functioning below baseline. Safety concerns for pt to go home alone upon D/C. Recommend further inpt PT. Will cont PT while here to maximize mobility & independence.   Treatment Diagnosis: decreased functional mobility  Therapy Prognosis: Good  Decision Making: Low Complexity  Requires PT Follow-Up: Yes  Activity Tolerance  Activity Tolerance: Patient limited by fatigue;Patient limited by endurance     Plan   Plan  Plan:  (2-5)  Current Treatment Recommendations: Strengthening, Functional mobility training, Transfer training, Endurance training, Gait training, Safety education & training  Safety Devices  Type of Devices: Call light within reach, Chair alarm in place, Nurse notified, Left in chair, Gait belt     Restrictions  Position Activity Restriction  Other position/activity restrictions: up with asssitance ; Droplet plus     Subjective   Pain: denies  General  Chart Reviewed: Yes  Patient assessed for rehabilitation services?: Yes  Additional Pertinent Hx: Zac Stoner is a 79 y.o. male with PMH of alcoholic liver cirrhosis, GERD, hyponatremia who presents to the ED from [facility] with shortness of breath and chills. Patient reported he has been feeling sick for the past couple of days was diagnosed with COVID on 8/29/2022  Family / Caregiver Present: No  Referring Practitioner: Konstantin He  Diagnosis: sepsis. cirrhosis  Follows Commands: Within Functional Limits  Subjective  Subjective: Pt supine in bed & agreeable to PT.          Social/Functional History  Social/Functional History  Lives With: Alone  Type of Home: Apartment  Home Layout: One level  Home Access: Elevator, Level entry  Bathroom Shower/Tub: Tub/Shower unit (however pt typically bathes at the sink)  Bathroom Toilet: Standard  Bathroom Accessibility: Accessible  Home Equipment: Alert Button, Walker, rolling, Wheelchair-manual  Has the patient had two or more falls in the past year or any fall with injury in the past year?: Yes (\"I had a fall at home\"- sideways and hit the floor and was on the floor for 19 hrs)  Receives Help From: Home health (home health aid comes 2x a week)  ADL Assistance: Independent  Homemaking Assistance: Needs assistance  Laundry: Stand by  Cleaning:  (home health aid)  Shopping:  (home health aid performs)  Homemaking Responsibilities: Yes  Meal Prep Responsibility: Primary  Laundry Responsibility: Primary  Bill Paying/Finance Responsibility: Primary  Ambulation Assistance: Independent (with 2WW)  Transfer Assistance: Independent  Active : No  Patient's  Info: \" I don't drive \"  Mode of Transportation: [address]: Watch TV and do crosswords  Additional Comments: Pt does not have 24hr or family/friends in the area; Prior to hosptial admission: Pt was at [facility] who reported the patient was admitted at the facility on 9/2/2022 after he was discharged from the [facility].  Patient was admitted to the [facility] for hyponatremia and was discharged with lactulose and rifaximin. Vision/Hearing  Vision  Vision: Within Functional Limits  Hearing  Hearing: Within functional limits    Cognition   Orientation  Overall Orientation Status: Within Normal Limits     Objective   Heart Rate: (!) 104  Heart Rate Source: Monitor  BP: (!) 152/86  BP Location: Right upper arm  BP Method: Automatic  Patient Position: Semi fowlers  MAP (Calculated): 108  Resp: 20  SpO2: 100 %  O2 Device: None (Room air)              AROM RLE (degrees)  RLE AROM: WNL  AROM LLE (degrees)  LLE AROM : WNL  Strength RLE  Strength RLE: WFL  Strength LLE  Strength LLE: WFL        Bed Mobility Training  Bed Mobility Training: Yes  Supine to Sit: Moderate assistance (HOB elevated)  Scooting: Contact-guard assistance (seated)  Balance  Sitting: Intact  Sitting - Static: Good (unsupported)  Sitting - Dynamic: Fair (occasional); Supported sitting  Standing:  (static; CGA to min A, dynamic: min A)  Standing - Static: Constant support (CGA- min A at 2WW for 1 min)  Standing - Dynamic: Constant support  Transfer Training  Transfer Training: Yes  Sit to Stand: Minimum assistance (from bed & chair.  verbal cues required)  Stand to Sit: Minimum assistance (verbal cues required)  Stand Pivot Transfers: Minimum assistance (bed->chair with RW)  Bed to Chair: Minimum assistance  Gait Training  Gait Training:  (pt took a few steps with RW & min A during pivot transfer as above.  declined further amb d/t fatigue.)  Gait  Overall Level of Assistance:  (pt denied to attempt further fx mobility 2/2 fatigue and decreased endurance)  Assistive Device: Walker, rolling  Wheelchair Management  Wheelchair Management: No                                                                        AM-PAC Score  AM-PAC Inpatient Mobility Raw Score : 14 (09/13/22 1048)  AM-PAC Inpatient T-Scale Score : 38.1 (09/13/22 1048)  Mobility Inpatient CMS 0-100% Score: 61.29 (09/13/22 1048)  Mobility Inpatient CMS G-Code Modifier : CL (09/13/22 1048)               Goals  Short Term Goals  Time Frame for Short term goals: D/C  Short term goal 1: supine<->sit min A  Short term goal 2: sit<->stand CGA  Short term goal 3: Amb 10 ft with RW CGA  Patient Goals   Patient goals : to get stronger       Education  Patient Education  Education Given To: Patient  Education Provided: Role of Therapy;Plan of Care;Transfer Training  Education Method: Demonstration;Verbal  Education Outcome: Verbalized understanding;Demonstrated understanding;Continued education needed      Therapy Time   Individual Concurrent Group Co-treatment   Time In 0959         Time Out [address], PT

## 2022-09-13 NOTE — PROGRESS NOTES
Notified by lab of critical result MRSA nares +. Primary RN Reynold Collier made aware. Perfect Serve message sent to resident Dr. Huy Dent at [phone].

## 2022-09-13 NOTE — PLAN OF CARE
Problem: Discharge Planning  Goal: Discharge to home or other facility with appropriate resources  9/13/2022 1949 by Maggie Castellanos RN  Outcome: Progressing  Flowsheets (Taken 9/13/2022 1949)  Discharge to home or other facility with appropriate resources:   Identify barriers to discharge with patient and caregiver   Arrange for needed discharge resources and transportation as appropriate   Identify discharge learning needs (meds, wound care, etc)   Arrange for interpreters to assist at discharge as needed  Note: V/S stable. PICC line inserted for IV antibiotics after d/c.  PT/OT evaluated today. Problem: Skin/Tissue Integrity  Goal: Absence of new skin breakdown  Description: 1. Monitor for areas of redness and/or skin breakdown  2. Assess vascular access sites hourly  3. Every 4-6 hours minimum:  Change oxygen saturation probe site  4. Every 4-6 hours:  If on nasal continuous positive airway pressure, respiratory therapy assess nares and determine need for appliance change or resting period. 9/13/2022 1949 by Maggie Castellanos RN  Outcome: Progressing  Note: Pt refused turns. Encouraged pt to turn Q2. Mepilex on sacrum. Heels elevated. No new signs of skin breakdown seen. Problem: Safety - Adult  Goal: Free from fall injury  9/13/2022 1949 by Maggie Castellanos RN  Outcome: Progressing  Note: Pt is a Fall Risk. See Araseli Buckle Fall Risk Score. Pt bed in low position and side rails up. Call light and belongings in reach. Pt encouraged to call for assistance. Pt SBA w/ gait belt and walker. Will continue with hourly rounds for PO intake, pain needs, toileting, and repositioning as needed.

## 2022-09-13 NOTE — CONSULTS
PICC line education:    -Risks  -Benefits  -Alternatives  -Procedure    Discussed the above with patient, verbalized understanding, answered all questions. Provided with information on PICC care to review. PICC tip verified via [employer] (Ok to use). Reported off to patient's  Nurse Anahi.

## 2022-09-13 NOTE — PROGRESS NOTES
Progress Note    Admit Date: 9/10/2022  Day: 2  Diet: ADULT DIET; Regular; Low Sodium (2 gm)    CC: SOB    Interval history: ELLYN. Feels better than when he first came in. Still with primary complaints of RUQ pain and cough. No other new or worsening symptoms. Diarrhea improving with holding lactulose. Motivated to work with PT/OT today. Medications:     Scheduled Meds:   meropenem  1,000 mg IntraVENous Q8H    sodium chloride flush  5-40 mL IntraVENous 2 times per day    [Held by provider] enoxaparin  30 mg SubCUTAneous BID    vancomycin  1,000 mg IntraVENous Q12H    gabapentin  300 mg Oral BID    [Held by provider] lactulose  20 g Oral TID    midodrine  5 mg Oral TID WC    pantoprazole  40 mg Oral QAM AC    tamsulosin  0.4 mg Oral Daily    rifAXIMin  550 mg Oral BID     Continuous Infusions:   sodium chloride       PRN Meds:benzonatate, sodium chloride flush, sodium chloride, acetaminophen **OR** acetaminophen, diphenhydrAMINE, ondansetron, prochlorperazine    Objective:   Vitals:   T-max:  Patient Vitals for the past 8 hrs:   BP Temp Temp src Pulse Resp SpO2   09/13/22 0534 114/79 98.4 °F (36.9 °C) Oral (!) 107 16 100 %         Intake/Output Summary (Last 24 hours) at 9/13/2022 0806  Last data filed at 9/12/2022 1134  Gross per 24 hour   Intake 2969.34 ml   Output 0 ml   Net 2969.34 ml         Review of Systems   Constitutional:  Positive for chills. Negative for fever. HENT:  Negative for congestion. Respiratory:  Positive for cough and shortness of breath. Cardiovascular:  Negative for chest pain and palpitations. Gastrointestinal:  Negative for abdominal pain, diarrhea, nausea and vomiting. Endocrine: Positive for polyuria. Genitourinary:  Negative for dysuria. Musculoskeletal:  Negative for arthralgias and myalgias. Neurological:  Negative for headaches. Physical Exam  Constitutional:       General: He is not in acute distress. Appearance: Normal appearance. He is obese.  He is not ill-appearing or diaphoretic. HENT:      Head: Normocephalic and atraumatic. Eyes:      Extraocular Movements: Extraocular movements intact. Cardiovascular:      Rate and Rhythm: Normal rate and regular rhythm. Heart sounds: No murmur heard. No friction rub. No gallop. Pulmonary:      Breath sounds: No wheezing, rhonchi or rales. Abdominal:      General: There is distension. Tenderness: There is no abdominal tenderness. There is no guarding or rebound. Musculoskeletal:      Right lower leg: No edema. Left lower leg: No edema. Skin:     General: Skin is warm. Findings: Lesion and rash present. Comments: Multiple scratches on abdomen. Multiple scratches on RLE   Neurological:      Mental Status: He is alert. Mental status is at baseline. LABS:    CBC:   Recent Labs     09/11/22 0421 09/12/22 0424 09/13/22 0441   WBC 7.6 4.9 3.1*   HGB 7.9* 8.9* 8.7*   HCT 22.9* 26.8* 25.6*   PLT 86* 98* 87*   .9* 106.8* 104.5*       Renal:    Recent Labs     09/10/22  1335 09/11/22  0421 09/12/22  0909 09/12/22  2201 09/13/22 0441   *   < > 130* 133* 135*   K 4.2   < > 3.7 3.9 4.4   CL 98*   < > 103 104 105   CO2 15*   < > 16* 17* 19*   BUN 10   < > 11 10 8   CREATININE 0.9   < > 0.8 0.6* 0.6*   GLUCOSE 122*   < > 110* 108* 80   CALCIUM 8.5   < > 7.6* 7.6* 8.1*   MG 1.50*  --   --   --   --    ANIONGAP 16   < > 11 12 11    < > = values in this interval not displayed. Hepatic:   Recent Labs     09/10/22  1335 09/11/22  2155 09/13/22 0441   * 68* 86*   ALT 33 23 25   BILITOT 9.4* 6.2* 5.3*   BILIDIR  --  4.4* 3.5*   PROT 6.3* 4.6* 5.3*   LABALBU 2.9* 2.2* 2.3*   ALKPHOS 310* 218* 253*       Troponin:   Recent Labs     09/10/22  1335   TROPONINI <0.01       BNP: No results for input(s): BNP in the last 72 hours. Lipids: No results for input(s): CHOL, HDL in the last 72 hours.     Invalid input(s): LDLCALCU, TRIGLYCERIDE  ABGs:  No results for input(s): PHART, VIO6NFA, PO2ART, XFV9AQQ, BEART, THGBART, G0SLPCDZ, YOW3GXR in the last 72 hours. INR:   Recent Labs     09/10/22  1335   INR 1.43*       Lactate: No results for input(s): LACTATE in the last 72 hours. Cultures:  -----------------------------------------------------------------  RAD:   CTA ABDOMEN PELVIS W WO CONTRAST   Final Result      No sign of any aneurysm or dissection of the aorta or branches of the aorta      Incidental findings include fatty liver, borderline in size and moderate splenomegaly      Cholelithiasis with trace free fluid in the abdomen along left paracolic gutter of uncertain etiology      Scattered diverticulosis but no sign of any acute or active diverticulitis or obstruction      Bladder wall thickening appreciated. Fat-containing inguinal hernias noted bilaterally      CTA CHEST W WO CONTRAST   Final Result      No sign of any aneurysm or dissection of the aorta or branches of the aorta      Incidental findings include fatty liver, borderline in size and moderate splenomegaly      Cholelithiasis with trace free fluid in the abdomen along left paracolic gutter of uncertain etiology      Scattered diverticulosis but no sign of any acute or active diverticulitis or obstruction      Bladder wall thickening appreciated. Fat-containing inguinal hernias noted bilaterally      XR CHEST PORTABLE   Final Result      No acute radiographic abnormality. Assessment/Plan:   Fadia Rios is a 79 y.o. male with PMH of alcoholic liver cirrhosis, GERD, hyponatremia who presents to the ED from [address] [facility] facility with shortness of breath and chills. Sepsis rule out  COVID-19 positive   Patient came in to ED with tachycardia, tachypnea, CO2 15, LA 4.1. Responded well to 1L bolus. LA down trended to 1.8. WBC normal. CXR/CT chest/abd/pel unremarkable for infectious etiology except for bladder wall thickening.  UA w positive nitrite, large LE, growing E Coli  - Bcx x1 for ESBL, sensitivities pending  - MRSA nares positive  - Suspect sepsis could be 2/2 to UTI  - Abx: Vanc, Merrem  - IVF @ 75mL/hr    Acute decompensated alcoholic liver cirrhosis (MELD-Na of 25 - 14-15% 90 day mortality)  Hx of alcoholic cirrhosis per patient and per nurse at assisted living. Patient is jaundiced and has caput medusae, as well as fatty liver and splenomegaly on imaging. Unable to access [facility] records at this time. Patient reports that he does not have a liver doctor or GI doctor, only a PCP. Current MELD is 25. Last drink 5 months ago per patient. - Continue home rifaximin  - Holding lactulose, patient doesn't take at home, complaining of diarrhea  - Continue home 5 mg midodrine Q8H for SBP <100  - GI consult --> follow up with Dr. Cici Osorio for EGD and outpatient follow up in 1 month    Hyponatremia - resolved  Na 126 on admission. Suspect etiology could be due to volume overload. - Na 135    ? Pericardial effusion - resolved  Seen on POCUS echo in ED - mixed density concerned for clotted blood, though not seen on CTA chest  - No concern for tamponade physiology  - Cards consult - no intervention  - Complete echo w trivial effusion    Chronic Conditions  Alcoholic polyneuropathy  - Continue home gabapentin 300 mg BID     GERD  - Continue home protonix 40 mg daily     HTN  Mild hypotension on admission  - Hold home metoprolol succinate 12.5 mg daily, restart if pressures remain normal or elevated     BPH/Urinary retention  - Continue home tamsulosin 0.4 mg QHS    Code Status: Full Code  FEN: ADULT DIET; Regular;  Low Sodium (2 gm)   PPX: Protonix  DISPO: Ofelia Lange MD, PGY-1  09/13/22  8:06 AM    This patient has been staffed and discussed with Inés Bravo MD.

## 2022-09-14 LAB
ALBUMIN SERPL-MCNC: 2.4 G/DL (ref 3.4–5)
ALP BLD-CCNC: 309 U/L (ref 40–129)
ALT SERPL-CCNC: 26 U/L (ref 10–40)
ANION GAP SERPL CALCULATED.3IONS-SCNC: 10 MMOL/L (ref 3–16)
AST SERPL-CCNC: 94 U/L (ref 15–37)
BASOPHILS ABSOLUTE: 0.1 K/UL (ref 0–0.2)
BASOPHILS RELATIVE PERCENT: 1.7 %
BILIRUB SERPL-MCNC: 4.8 MG/DL (ref 0–1)
BILIRUBIN DIRECT: 3.3 MG/DL (ref 0–0.3)
BILIRUBIN, INDIRECT: 1.5 MG/DL (ref 0–1)
BLOOD CULTURE, ROUTINE: NORMAL
BUN BLDV-MCNC: 6 MG/DL (ref 7–20)
CALCIUM SERPL-MCNC: 8 MG/DL (ref 8.3–10.6)
CHLORIDE BLD-SCNC: 102 MMOL/L (ref 99–110)
CO2: 19 MMOL/L (ref 21–32)
CREAT SERPL-MCNC: 0.6 MG/DL (ref 0.8–1.3)
EOSINOPHILS ABSOLUTE: 0.1 K/UL (ref 0–0.6)
EOSINOPHILS RELATIVE PERCENT: 2.3 %
GFR AFRICAN AMERICAN: >60
GFR NON-AFRICAN AMERICAN: >60
GLUCOSE BLD-MCNC: 101 MG/DL (ref 70–99)
HCT VFR BLD CALC: 25.6 % (ref 40.5–52.5)
HEMOGLOBIN: 8.4 G/DL (ref 13.5–17.5)
LYMPHOCYTES ABSOLUTE: 0.9 K/UL (ref 1–5.1)
LYMPHOCYTES RELATIVE PERCENT: 24.8 %
MCH RBC QN AUTO: 35.5 PG (ref 26–34)
MCHC RBC AUTO-ENTMCNC: 32.8 G/DL (ref 31–36)
MCV RBC AUTO: 108.1 FL (ref 80–100)
MONOCYTES ABSOLUTE: 0.5 K/UL (ref 0–1.3)
MONOCYTES RELATIVE PERCENT: 13.9 %
NEUTROPHILS ABSOLUTE: 2 K/UL (ref 1.7–7.7)
NEUTROPHILS RELATIVE PERCENT: 57.3 %
PDW BLD-RTO: 15.8 % (ref 12.4–15.4)
PLATELET # BLD: 84 K/UL (ref 135–450)
PMV BLD AUTO: 9.9 FL (ref 5–10.5)
POTASSIUM REFLEX MAGNESIUM: 3.9 MMOL/L (ref 3.5–5.1)
RBC # BLD: 2.37 M/UL (ref 4.2–5.9)
SODIUM BLD-SCNC: 131 MMOL/L (ref 136–145)
TOTAL PROTEIN: 5.2 G/DL (ref 6.4–8.2)
WBC # BLD: 3.5 K/UL (ref 4–11)

## 2022-09-14 PROCEDURE — 6360000002 HC RX W HCPCS: Performed by: INTERNAL MEDICINE

## 2022-09-14 PROCEDURE — 80048 BASIC METABOLIC PNL TOTAL CA: CPT

## 2022-09-14 PROCEDURE — 6370000000 HC RX 637 (ALT 250 FOR IP)

## 2022-09-14 PROCEDURE — 1200000000 HC SEMI PRIVATE

## 2022-09-14 PROCEDURE — 85025 COMPLETE CBC W/AUTO DIFF WBC: CPT

## 2022-09-14 PROCEDURE — 2580000003 HC RX 258: Performed by: INTERNAL MEDICINE

## 2022-09-14 PROCEDURE — 2580000003 HC RX 258

## 2022-09-14 PROCEDURE — 99232 SBSQ HOSP IP/OBS MODERATE 35: CPT | Performed by: INTERNAL MEDICINE

## 2022-09-14 PROCEDURE — 80076 HEPATIC FUNCTION PANEL: CPT

## 2022-09-14 RX ADMIN — RIFAXIMIN 550 MG: 550 TABLET ORAL at 20:58

## 2022-09-14 RX ADMIN — SODIUM CHLORIDE, PRESERVATIVE FREE 10 ML: 5 INJECTION INTRAVENOUS at 09:59

## 2022-09-14 RX ADMIN — SODIUM CHLORIDE, PRESERVATIVE FREE 10 ML: 5 INJECTION INTRAVENOUS at 09:58

## 2022-09-14 RX ADMIN — MEROPENEM 1000 MG: 1 INJECTION, POWDER, FOR SOLUTION INTRAVENOUS at 18:03

## 2022-09-14 RX ADMIN — SODIUM CHLORIDE, PRESERVATIVE FREE 5 ML: 5 INJECTION INTRAVENOUS at 21:06

## 2022-09-14 RX ADMIN — GUAIFENESIN SYRUP AND DEXTROMETHORPHAN 5 ML: 100; 10 SYRUP ORAL at 15:27

## 2022-09-14 RX ADMIN — RIFAXIMIN 550 MG: 550 TABLET ORAL at 09:58

## 2022-09-14 RX ADMIN — MEROPENEM 1000 MG: 1 INJECTION, POWDER, FOR SOLUTION INTRAVENOUS at 10:03

## 2022-09-14 RX ADMIN — SODIUM CHLORIDE: 9 INJECTION, SOLUTION INTRAVENOUS at 10:01

## 2022-09-14 RX ADMIN — SODIUM CHLORIDE, PRESERVATIVE FREE 10 ML: 5 INJECTION INTRAVENOUS at 20:58

## 2022-09-14 RX ADMIN — GABAPENTIN 300 MG: 300 CAPSULE ORAL at 20:58

## 2022-09-14 RX ADMIN — GABAPENTIN 300 MG: 300 CAPSULE ORAL at 09:58

## 2022-09-14 RX ADMIN — TAMSULOSIN HYDROCHLORIDE 0.4 MG: 0.4 CAPSULE ORAL at 09:58

## 2022-09-14 RX ADMIN — SODIUM CHLORIDE: 9 INJECTION, SOLUTION INTRAVENOUS at 18:03

## 2022-09-14 RX ADMIN — PANTOPRAZOLE SODIUM 40 MG: 40 TABLET, DELAYED RELEASE ORAL at 05:13

## 2022-09-14 RX ADMIN — MEROPENEM 1000 MG: 1 INJECTION, POWDER, FOR SOLUTION INTRAVENOUS at 02:42

## 2022-09-14 ASSESSMENT — PAIN - FUNCTIONAL ASSESSMENT
PAIN_FUNCTIONAL_ASSESSMENT: ACTIVITIES ARE NOT PREVENTED
PAIN_FUNCTIONAL_ASSESSMENT: ACTIVITIES ARE NOT PREVENTED

## 2022-09-14 ASSESSMENT — PAIN SCALES - GENERAL
PAINLEVEL_OUTOF10: 7
PAINLEVEL_OUTOF10: 0
PAINLEVEL_OUTOF10: 7
PAINLEVEL_OUTOF10: 7
PAINLEVEL_OUTOF10: 3
PAINLEVEL_OUTOF10: 0
PAINLEVEL_OUTOF10: 0

## 2022-09-14 ASSESSMENT — PAIN DESCRIPTION - ONSET
ONSET: ON-GOING

## 2022-09-14 ASSESSMENT — PAIN DESCRIPTION - DESCRIPTORS
DESCRIPTORS: ACHING

## 2022-09-14 ASSESSMENT — PAIN DESCRIPTION - FREQUENCY
FREQUENCY: CONTINUOUS
FREQUENCY: INTERMITTENT
FREQUENCY: INTERMITTENT

## 2022-09-14 ASSESSMENT — PAIN DESCRIPTION - PAIN TYPE
TYPE: ACUTE PAIN

## 2022-09-14 ASSESSMENT — PAIN DESCRIPTION - LOCATION
LOCATION: LEG
LOCATION: LEG
LOCATION: ABDOMEN

## 2022-09-14 ASSESSMENT — ENCOUNTER SYMPTOMS
COUGH: 1
VOMITING: 0
NAUSEA: 0
ABDOMINAL PAIN: 0
DIARRHEA: 0

## 2022-09-14 ASSESSMENT — PAIN DESCRIPTION - ORIENTATION
ORIENTATION: RIGHT

## 2022-09-14 NOTE — PROGRESS NOTES
Physician Progress Note      PATIENT:               Dusty Gray  CSN #:                  [id number]  :                       1955  ADMIT DATE:       9/10/2022 1:04 PM  [address] [place] DATE:  RESPONDING  PROVIDER #:        Jose Barthel MD          QUERY TEXT:    Patient admitted with Sepsis due to UTI and COVID-19 infection. Noted   documentation of Septic shock in d/c summary. In order to support the   diagnosis of shock, please include additional clinical indicators in your   documentation. Or please document if the diagnosis of shock has been ruled   out after further study. The medical record reflects the following:  Risk Factors: Sepsis, UTI, COVID  Clinical Indicators: BP on admit: 115/64- 97/58, Lowest BP: 92/55 on , No   Vasopressors required. Treatment: 1L LR bolus, IVF @75, Merrem, Vanc, Blood cx, Urine cx  Options provided:  -- Shock present as evidenced by, Please document evidence. -- Shock was ruled out  -- Other - I will add my own diagnosis  -- Disagree - Not applicable / Not valid  -- Disagree - Clinically unable to determine / Unknown  -- Refer to Clinical Documentation Reviewer    PROVIDER RESPONSE TEXT:    Shock was ruled out after study. Query created by:  Rachid Henriquez on 2022 3:45 PM      Electronically signed by:  Jose Barthel MD 2022 7:25 PM

## 2022-09-14 NOTE — PLAN OF CARE
Problem: Discharge Planning  Goal: Discharge to home or other facility with appropriate resources  Outcome: Progressing  Flowsheets (Taken 9/14/2022 1455)  Discharge to home or other facility with appropriate resources:   Identify barriers to discharge with patient and caregiver   Arrange for needed discharge resources and transportation as appropriate   Identify discharge learning needs (meds, wound care, etc)  Note: Pt getting IV antibiotics for sepsis. PICC placed yesterday for long-term antibiotic use. V/S stable and WNL. Pain controlled. Problem: Pain  Goal: Verbalizes/displays adequate comfort level or baseline comfort level  9/14/2022 1458 by Harvey Suazo RN  Outcome: Progressing  Flowsheets (Taken 9/14/2022 1455)  Verbalizes/displays adequate comfort level or baseline comfort level:   Encourage patient to monitor pain and request assistance   Assess pain using appropriate pain scale   Administer analgesics based on type and severity of pain and evaluate response   Implement non-pharmacological measures as appropriate and evaluate response  Note: Pt able to utilize numeric pain scale to report pain. Rest, repositioning, and environmental changes were used to help manage pain. Pt reports satisfaction w/ pain management plan. Problem: Safety - Adult  Goal: Free from fall injury  9/14/2022 1458 by Harvey Suazo RN  Outcome: Progressing  Note: Pt is a Fall Risk. See Diamond Velazquez Fall Risk Score. Pt bed in low position and side rails up. Call light and belongings in reach. Pt encouraged to call for assistance. Will continue with hourly rounds for PO intake, pain needs, toileting, and repositioning as needed.

## 2022-09-14 NOTE — DISCHARGE SUMMARY
Discharge Summary    Date: 9/15/2022  Patient Name: Linden Rock    YOB: 1955     Age: 79 y.o. Admit Date: 9/10/2022  Discharge Date: 9/14/2022  Discharge Condition: Stable    Admission Diagnosis  Hypomagnesemia [E83.42]; Alcoholic cirrhosis of liver without ascites ([facility] [K70.30]; [facility] shock ([facility] [A41.9, R65.21]; Sepsis, due to unspecified organism, unspecified whether acute organ dysfunction present ([facility] [A41.9]; COVID [U07.1]      Discharge Diagnosis  Principal Problem:    Septic shock ([facility]  Active Problems:    UTI due to extended-spectrum beta lactamase (ESBL) producing Escherichia coli    Infection due to ESBL-producing Escherichia coli    Alcoholic cirrhosis of liver without ascites ([facility]  Resolved Problems:    * No resolved hospital problems. [facility] AND [facility] Stay  Narrative of Hospital Course:  HPI: Linden Rock is a 79 y.o. male with PMH of alcoholic liver cirrhosis, GERD, hyponatremia who presents to the ED from [address] [place] [address] with shortness of breath and chills. Diagnosed with COVID on 8/29/2022, but did not feel sick until the past few days. He endorses generalized weakness, reports that his skin has become increasingly jaundiced during this time, and his urine has become dark in color. He reports endorsing chills associated with shaking but denies having any fevers. He reports endorsing abdominal pain and discomfort denies having any hematemesis/melena. Reports he has intermittent chest pain which is a pressure-like symptom and at times also feels his heart is racing. Also reports that his urine stream used to be stronger, now it \"dribbles out slowly. \" ED called the nurse at [facility] who reported the patient was admitted at the facility on 9/2/2022 after he was discharged from the [facility]. Patient was admitted to the [facility] for weakness, hyponatremia and was discharged with lactulose and rifaximin.  Patient reports last drink of alcohol was ~5 months ago. ED Course: In the ED patient was tachycardic, tachypneic, and mildly hypotensive to 97/58. Received 1L bolus fluids and was started on vancomycin and cefepime. Was also started on 2L NC d/t respiratory effort. BMP significant for Na 129, CO2 15, Mg 1.5, LA 4.1. Hepatic panel w alp phos 310, ALT 33, , bilirubin 9.4. WBC 7.6, Hgb 9.8, plt 169. INR 1.43. VBG w pCO2 25.9, pO2 70.7. CT chest/abd/pel significant for fatty liver, splenomegaly, bladder wall thickening. UA pending. Hepatitis panel pending. Unable to access [facility] records at this time. Admission:  Sepsis rule out, COVID-19 positive   Patient came in to ED with tachycardia, tachypnea, CO2 15, LA 4.1. Responded well to 1L bolus. LA down trended to 1.8. WBC normal. CXR/CT chest/abd/pel unremarkable for infectious etiology except for bladder wall thickening. UA w positive nitrite, large LE, growing E Coli  - Bcx x1 for ESBL, sensitive to ertapenem  - Suspect sepsis most likely 2/2 to UTI  - Abx: Merrem --> transition to ertapenem  - ID consult  --> de-escalated vanc, 10 days of ertapenem as outpatient     Acute decompensated alcoholic liver cirrhosis (MELD-Na of 25 - 14-15% 90 day mortality)  Hx of alcoholic cirrhosis per patient and per nurse at assisted living. Patient is jaundiced and has caput medusae, as well as fatty liver and splenomegaly on imaging. Unable to access [facility] records at this time. Patient reports that he does not have a liver doctor or GI doctor, only a PCP. Current MELD is 25. Last drink 5 months ago per patient. - Continue home rifaximin  - Holding lactulose, patient doesn't take at home, complaining of diarrhea  - Continue home 5 mg midodrine Q8H for SBP <100  - GI consult --> follow up with Dr. Moira Gandara for EGD and outpatient follow up in 1 month     Hyponatremia - resolved  Na 126 on admission, trended up and steady around 130. Suspect etiology could be due to volume overload.   - No intervention at this time ?Pericardial effusion - resolved  Seen on POCUS echo in ED - mixed density concerned for clotted blood, though not seen on CTA chest  - No concern for tamponade physiology  - Cards consult - no intervention  - Complete echo w trivial effusion     Chronic Conditions  Alcoholic polyneuropathy  - Continue home gabapentin 300 mg BID     GERD  - Continue home protonix 40 mg daily     HTN  Mild hypotension on admission  - Hold home metoprolol succinate 12.5 mg daily, restart if pressures remain normal or elevated     BPH/Urinary retention  - Continue home tamsulosin 0.4 mg QHS    Consultants:  IP CONSULT TO CRITICAL CARE  IP CONSULT TO HOSPITALIST  PHARMACY TO DOSE VANCOMYCIN  IP CONSULT TO GI  IP CONSULT TO CARDIOLOGY  IP CONSULT TO INFECTIOUS DISEASES  IP CONSULT TO SOCIAL WORK    Surgeries/procedures Performed:      Treatments:    Antibiotics    Meropenem, Vancomycin and Other    Discharge Plan/Disposition:  Home    Hospital/Incidental Findings Requiring Follow Up:    Patient Instructions:    Diet: Regular Diet    Activity:Activity as Tolerated  For number of days (if applicable): Other Instructions:    Provider Follow-Up:   No follow-ups on file. Significant Diagnostic Studies:    Recent Labs:  Admission on 09/10/2022  No results displayed because visit has over 200 results. ------------    Radiology last 7 days:  CTA CHEST W WO CONTRAST    Result Date: 9/10/2022  No sign of any aneurysm or dissection of the aorta or branches of the aorta Incidental findings include fatty liver, borderline in size and moderate splenomegaly Cholelithiasis with trace free fluid in the abdomen along left paracolic gutter of uncertain etiology Scattered diverticulosis but no sign of any acute or active diverticulitis or obstruction Bladder wall thickening appreciated. Fat-containing inguinal hernias noted bilaterally    XR CHEST PORTABLE    Result Date: 9/10/2022  No acute radiographic abnormality.      CTA ABDOMEN PELVIS W WO CONTRAST    Result Date: 9/10/2022  No sign of any aneurysm or dissection of the aorta or branches of the aorta Incidental findings include fatty liver, borderline in size and moderate splenomegaly Cholelithiasis with trace free fluid in the abdomen along left paracolic gutter of uncertain etiology Scattered diverticulosis but no sign of any acute or active diverticulitis or obstruction Bladder wall thickening appreciated. Fat-containing inguinal hernias noted bilaterally       Pending Labs     Order Current Status    Basic Metabolic Panel w/ Reflex to MG Collected (09/14/22 1042)    Culture, Blood 1 Preliminary result        Discharge Medications    Current Discharge Medication List        Current Discharge Medication List        Current Discharge Medication List    CONTINUE these medications which have NOT CHANGED    benzonatate (TESSALON) 100 MG capsule  Take 100 mg by mouth 3 times daily as needed for Cough    sucralfate (CARAFATE) 1 GM tablet  Take 1 g by mouth 4 times daily    ergocalciferol (ERGOCALCIFEROL) 1.25 MG (63150 UT) capsule  Take 50,000 Units by mouth once a week Mondays    ferrous sulfate (IRON 325) 325 (65 Fe) MG tablet  Take 325 mg by mouth daily (with breakfast)    folic acid (FOLVITE) 1 MG tablet  Take 1 mg by mouth daily    gabapentin (NEURONTIN) 300 MG capsule  Take 300 mg by mouth in the morning and at bedtime.     lactulose (CHRONULAC) 10 GM/15ML solution  Take 30 g by mouth 3 times daily    metoprolol succinate (TOPROL XL) 25 MG extended release tablet  Take 12.5 mg by mouth daily 1/2 tablet daily    midodrine (PROAMATINE) 5 MG tablet  Take 5 mg by mouth 3 times daily as needed When SBP <100    Multiple Vitamins-Minerals (THERAPEUTIC MULTIVITAMIN-MINERALS) tablet  Take 1 tablet by mouth daily    Amino Acids-Protein Hydrolys (PRO-STAT MAX) LIQD  Take 30 mLs by mouth daily    pantoprazole (PROTONIX) 40 MG tablet  Take 40 mg by mouth daily    guaiFENesin-dextromethorphan (ROBITUSSIN DM) 100-10 MG/5ML syrup  Take 5 mLs by mouth 4 times daily as needed for Cough    tamsulosin (FLOMAX) 0.4 MG capsule  Take 0.4 mg by mouth every evening    vitamin B-1 (THIAMINE) 100 MG tablet  Take 100 mg by mouth daily    rifAXIMin (XIFAXAN) 550 MG tablet  Take 550 mg by mouth 2 times daily    zinc oxide 20 % ointment  Apply topically as needed for Dry Skin Apply topically as needed. Current Discharge Medication List    STOP taking these medications    bisacodyl (DULCOLAX) 10 MG suppository  Comments:  Reason for Stopping:    polyethylene glycol (MIRALAX) 17 g PACK packet  Comments:  Reason for Stopping:    sennosides-docusate sodium (SENOKOT-S) 8.6-50 MG tablet  Comments:  Reason for Stopping:    sodium chloride 1 g tablet  Comments:  Reason for Stopping:          Time Spent on Discharge:  30 minutes were spent in patient examination, evaluation, counseling as well as medication reconciliation, prescriptions for required medications, discharge plan, and follow up.     Electronically signed by Arabella Bergeron MD on 9/15/22 at 1:54 PM EDT

## 2022-09-14 NOTE — PROGRESS NOTES
Progress Note    Admit Date: 9/10/2022  Day: 2  Diet: ADULT DIET; Regular; Low Sodium (2 gm)  ADULT ORAL NUTRITION SUPPLEMENT; Breakfast, Lunch, Dinner; Low Calorie/High Protein Oral Supplement    CC: SOB    Interval history: NAEON. Patient feels well other than continued cough and RUQ pain. Ready to go home. Medications:     Scheduled Meds:   sodium chloride flush  5-40 mL IntraVENous 2 times per day    meropenem  1,000 mg IntraVENous Q8H    sodium chloride flush  5-40 mL IntraVENous 2 times per day    [Held by provider] enoxaparin  30 mg SubCUTAneous BID    gabapentin  300 mg Oral BID    [Held by provider] lactulose  20 g Oral TID    midodrine  5 mg Oral TID WC    pantoprazole  40 mg Oral QAM AC    tamsulosin  0.4 mg Oral Daily    rifAXIMin  550 mg Oral BID     Continuous Infusions:   sodium chloride      sodium chloride 25 mL/hr at 09/13/22 1739     PRN Meds:guaiFENesin-dextromethorphan, sodium chloride flush, sodium chloride, benzonatate, sodium chloride flush, sodium chloride, acetaminophen **OR** acetaminophen, diphenhydrAMINE, ondansetron, prochlorperazine    Objective:   Vitals:   T-max:  Patient Vitals for the past 8 hrs:   BP Temp Temp src Pulse Resp SpO2 Weight   09/14/22 0726 118/76 98 °F (36.7 °C) Oral 94 20 100 % --   09/14/22 0600 -- -- -- 96 -- -- --   09/14/22 0527 -- -- -- -- -- -- 212 lb 11.9 oz (96.5 kg)   09/14/22 0511 112/73 98.1 °F (36.7 °C) Oral 96 20 99 % --   09/14/22 0400 -- -- -- 72 -- -- --   09/14/22 0200 -- -- -- 73 -- -- --         Intake/Output Summary (Last 24 hours) at 9/14/2022 0838  Last data filed at 9/14/2022 0726  Gross per 24 hour   Intake 1350 ml   Output 0 ml   Net 1350 ml         Review of Systems   Constitutional:  Negative for fever. HENT:  Negative for congestion. Respiratory:  Positive for cough. Cardiovascular:  Negative for chest pain and palpitations. Gastrointestinal:  Negative for abdominal pain, diarrhea, nausea and vomiting.    Genitourinary: LDLCALCU, TRIGLYCERIDE  ABGs:  No results for input(s): PHART, HUR4NPM, PO2ART, ALN7JMX, BEART, THGBART, M1ZESNOZ, CHP4EWI in the last 72 hours. INR:   No results for input(s): INR in the last 72 hours. Lactate: No results for input(s): LACTATE in the last 72 hours. Cultures:  -----------------------------------------------------------------  RAD:   CTA ABDOMEN PELVIS W WO CONTRAST   Final Result      No sign of any aneurysm or dissection of the aorta or branches of the aorta      Incidental findings include fatty liver, borderline in size and moderate splenomegaly      Cholelithiasis with trace free fluid in the abdomen along left paracolic gutter of uncertain etiology      Scattered diverticulosis but no sign of any acute or active diverticulitis or obstruction      Bladder wall thickening appreciated. Fat-containing inguinal hernias noted bilaterally      CTA CHEST W WO CONTRAST   Final Result      No sign of any aneurysm or dissection of the aorta or branches of the aorta      Incidental findings include fatty liver, borderline in size and moderate splenomegaly      Cholelithiasis with trace free fluid in the abdomen along left paracolic gutter of uncertain etiology      Scattered diverticulosis but no sign of any acute or active diverticulitis or obstruction      Bladder wall thickening appreciated. Fat-containing inguinal hernias noted bilaterally      XR CHEST PORTABLE   Final Result      No acute radiographic abnormality. Assessment/Plan:   Diego Buckner is a 79 y.o. male with PMH of alcoholic liver cirrhosis, GERD, hyponatremia who presents to the ED from [address] [facility] with shortness of breath and chills. Sepsis rule out  COVID-19 positive   Patient came in to ED with tachycardia, tachypnea, CO2 15, LA 4.1. Responded well to 1L bolus. LA down trended to 1.8.  WBC normal. CXR/CT chest/abd/pel unremarkable for infectious etiology except for bladder wall thickening. UA w positive nitrite, large LE, growing E Coli  - Bcx x1 for ESBL, sensitivities pending  - MRSA nares positive  - Suspect sepsis could be 2/2 to UTI  - Abx: Merrem  - ID consult  --> de-escalated vanc, 10 days of ertapenem as outpatient  - Follow up blood culture taken yesterday, if no growth, patient appropriate for dc    Acute decompensated alcoholic liver cirrhosis (MELD-Na of 25 - 14-15% 90 day mortality)  Hx of alcoholic cirrhosis per patient and per nurse at assisted living. Patient is jaundiced and has caput medusae, as well as fatty liver and splenomegaly on imaging. Unable to access [facility] records at this time. Patient reports that he does not have a liver doctor or GI doctor, only a PCP. Current MELD is 25. Last drink 5 months ago per patient. - Continue home rifaximin  - Holding lactulose, patient doesn't take at home, complaining of diarrhea  - Continue home 5 mg midodrine Q8H for SBP <100  - GI consult --> follow up with Dr. Roberta Martinez for EGD and outpatient follow up in 1 month    Hyponatremia - resolved  Na 126 on admission, trended up and steady around 130. Suspect etiology could be due to volume overload. - No intervention at this time    ? Pericardial effusion - resolved  Seen on POCUS echo in ED - mixed density concerned for clotted blood, though not seen on CTA chest  - No concern for tamponade physiology  - Cards consult - no intervention  - Complete echo w trivial effusion    Chronic Conditions  Alcoholic polyneuropathy  - Continue home gabapentin 300 mg BID     GERD  - Continue home protonix 40 mg daily     HTN  Mild hypotension on admission  - Hold home metoprolol succinate 12.5 mg daily, restart if pressures remain normal or elevated     BPH/Urinary retention  - Continue home tamsulosin 0.4 mg QHS    Code Status: Full Code  FEN: ADULT DIET; Regular; Low Sodium (2 gm)  ADULT ORAL NUTRITION SUPPLEMENT; Breakfast, Lunch, Dinner;  Low Calorie/High Protein Oral Supplement   PPX: Protonix  DISPO: King Yasmin MD, PGY-1  09/14/22  8:38 AM    This patient has been staffed and discussed with Ari Heller MD.

## 2022-09-14 NOTE — PROGRESS NOTES
09/14/22 1345   Encounter Summary   Encounter Overview/Reason  Advance Care Planning   Service Provided For:   (consult with nurse)   Referral/Consult From: Other (comment)  (routine covid)   Support System Other (Comment)  (brother)   Last Encounter    (es 9/14)   Complexity of Encounter Moderate   Begin Time 1300   End Time  1320   Total Time Calculated 20 min   Plan and Referrals   Plan/Referrals No future visits requested   See acp note

## 2022-09-14 NOTE — ACP (ADVANCE CARE PLANNING)
Spoke with nurse, who said that it was already determined that pt's brother Devante Hutton is pt's primary decision maker due to being NOK. Although pt does not have full capacity at this time, these are his wishes.     Orlando Anni, Brother- primary decision maker [phone]

## 2022-09-14 NOTE — DISCHARGE INSTRUCTIONS
Please follow instructions for follow up appointments and follow medication list as instructed in discharge paperwork.

## 2022-09-14 NOTE — PROGRESS NOTES
ID Follow-up NOTE  RESIDENT NOTE - reviewed / edited, attending note at bottom    CC:   E coli ESBL UTI / bacteremia  Antibiotics: Meropenem    Admit Date: 9/10/2022  Hospital Day: 5    Subjective:     Patient has remained afebrile and HDS. States he still feels better, but not 100%. He denies fever, chills or abdominal pain. No dysuria, never any  complaints. Objective:     Patient Vitals for the past 8 hrs:   BP Temp Temp src Pulse Resp SpO2 Weight   09/14/22 0726 118/76 98 °F (36.7 °C) Oral 94 20 100 % --   09/14/22 0600 -- -- -- 96 -- -- --   09/14/22 0527 -- -- -- -- -- -- 212 lb 11.9 oz (96.5 kg)   09/14/22 0511 112/73 98.1 °F (36.7 °C) Oral 96 20 99 % --     I/O last 3 completed shifts: In: 2008 [P.O.:1200; I.V.:10; IV Piggyback:200]  Out: 0   I/O this shift:  In: 180 [P.O.:180]  Out: 0     EXAM:  GENERAL: No apparent distress.     HEENT: Membranes moist, no oral lesion  NECK:  Supple, no lymphadenopathy  LUNGS: Clear b/l, no rales, no dullness  CARDIAC: RRR, no murmur appreciated  ABD:  + BS, soft / NT, hepatomegaly with tenderness to palpation  EXT:  No rash, no edema, no lesions  NEURO: No focal neurologic findings  PSYCH: Orientation, sensorium, mood normal  LINES:  Peripheral iv       Data Review:  Lab Results   Component Value Date    WBC 3.5 (L) 09/14/2022    HGB 8.4 (L) 09/14/2022    HCT 25.6 (L) 09/14/2022    .1 (H) 09/14/2022    PLT 84 (L) 09/14/2022     Lab Results   Component Value Date    CREATININE 0.6 (L) 09/14/2022    BUN 6 (L) 09/14/2022     (L) 09/14/2022    K 3.9 09/14/2022     09/14/2022    CO2 19 (L) 09/14/2022       Hepatic Function Panel:   Lab Results   Component Value Date/Time    ALKPHOS 309 09/14/2022 05:18 AM    ALT 26 09/14/2022 05:18 AM    AST 94 09/14/2022 05:18 AM    PROT 5.2 09/14/2022 05:18 AM    BILITOT 4.8 09/14/2022 05:18 AM    BILIDIR 3.3 09/14/2022 05:18 AM    IBILI 1.5 09/14/2022 05:18 AM    LABALBU 2.4 09/14/2022 05:18 AM Micro:  Urine culture taken 9/10 growing ESBL  Blood cultures taken 9/10 also growing ESBL              -Resistant to ampicillin, cefepime, ceftriaxone, Cipro, levo, Bactrim  -Sensitive to Unasyn with WILFRIDO of 8, sensitive to ertapenem and gentamicin     Imaging:   CTA chest/abd/pel (9/10): Incidental findings include fatty liver, borderline in size and moderate splenomegaly       Cholelithiasis with trace free fluid in the abdomen along left paracolic gutter of uncertain etiology       Scattered diverticulosis but no sign of any acute or active diverticulitis or obstruction       Bladder wall thickening appreciated. Fat-containing inguinal hernias noted bilaterally       Scheduled Meds:   sodium chloride flush  5-40 mL IntraVENous 2 times per day    meropenem  1,000 mg IntraVENous Q8H    sodium chloride flush  5-40 mL IntraVENous 2 times per day    [Held by provider] enoxaparin  30 mg SubCUTAneous BID    gabapentin  300 mg Oral BID    [Held by provider] lactulose  20 g Oral TID    midodrine  5 mg Oral TID WC    pantoprazole  40 mg Oral QAM AC    tamsulosin  0.4 mg Oral Daily    rifAXIMin  550 mg Oral BID       Continuous Infusions:   sodium chloride      sodium chloride 25 mL/hr at 09/14/22 1001       PRN Meds:  guaiFENesin-dextromethorphan, sodium chloride flush, sodium chloride, benzonatate, sodium chloride flush, sodium chloride, acetaminophen **OR** acetaminophen, diphenhydrAMINE, ondansetron, prochlorperazine      Assessment:     Milo Garcia is a 79year old male with PMHx significant for GERD, hypertension, alcoholic liver cirrhosis, and recent COVID infection (8/29), who presented to the [facility]. on 9/10 for shortness of breath and generalized weakness. Patient also notes he was increasingly jaundiced.     #Urosepsis secondary to ESBL E coli acute cystitis with ESBL bacteremia  In ED 9/10, afeb, BP lowest 97/58, WBC 7.6  UA mod LE, micro >100 WBC/HPF  CT with bladder wall thickening  -On cefepime from 9/10-9/11, switched to meropenem on 11/9 to present; on vancomycin from 9/10 to present  -No leukocytosis ever  -Lactic acidosis improved from 4.1 on admission to 1.8 on 9/11     #Decompensated cirrhosis of the liver  -Minimal ascites  -Started on rifaximin on 9/10  -Bilirubin has decreased from 9.4 on admission to 6.2 to 5.3 this morning     Plan:     -ESBL bacteremia and presence of liver cirrhosis, recommend a 14-day course of carbapenems              -Continue meropenem while inpatient   -Start on ertapenem at discharge for 10 days, patient with PICC line (placed 9/13), to be administered while monitored at [facility], from whence the patient presented     -Please see LOREN    Discussed with Dr. Miguelangel Clark, DO    Addendum to Resident Progress note:  Pt seen, examined and evaluated. I have reviewed the current history, physical findings, labs and assessment and plan and agree with note as documented by resident (Dr. Live Castañeda). 80 yo man with hx cirrhosis, HTN, MERYL  Dx COVID-19 8/29     Presents with SOB, weakness     In ED 9/10, afeb, BP lowest 97/58, WBC 7.6  UA mod LE, micro >100 WBC/HPF  CT with bladder wall thickening  Admit, started on      Today 9/14 - feels better 'not 100%'     IMP/  Chronically ill  E coli ESBL UTI / bacteremia     REC/  Cont Meropenem  D/c Vancomycin     Given presentation and positive BC, will give course, iv carbapenem   Will use ertapenem, treat for 10 days   See LOREN     Medical Decision Making:   The following items were considered in medical decision making:  Discussion of patient care with other providers  Reviewed clinical lab tests  Reviewed radiology tests  Reviewed other diagnostic tests/interventions  Microbiology cultures and other micro tests reviewed       Discussed with pt  Munir Massey MD     INFUSION ORDERS:  Invanz 1 gm iv daily through 9/23  - Diagnosis - E coli UTI / bacteremia  - First dose given in hospital  - L PICC line maintenance - placed 9/13  - Disposition / date discharge - [facility] CBC w diff, CMP, ESR, CRP every Mon or Tue - FAX result to [phone]  - Call with antibiotic / infusion issues, [phone]  - Call with any change in status, transfer in or out of a facility or to hospital - [phone]  - No f/u in outpatient ID office necessary

## 2022-09-14 NOTE — PLAN OF CARE
Problem: Pain  Goal: Verbalizes/displays adequate comfort level or baseline comfort level  Outcome: Progressing  Flowsheets (Taken 9/14/2022 0757)  Verbalizes/displays adequate comfort level or baseline comfort level: Encourage patient to monitor pain and request assistance  Note: Pt has 10/10 generalized pain. Pain medication given per MD order. Will continue to monitor. Problem: Safety - Adult  Goal: Free from fall injury  9/14/2022 0757 by Marjan Mccrary RN  Outcome: Progressing  Flowsheets (Taken 9/14/2022 0757)  Free From Fall Injury: Instruct family/caregiver on patient safety  Note: Fall precautions are in place. Bed alarm is on and in lowest position. Pt is using call light appropriately. Will continue to monitor.

## 2022-09-14 NOTE — CARE COORDINATION
CM following for discharge planning. Precert started on 2/49/96. CM spoke with Tarun Marie in administration at [facility], precert is still pending. CM spoke with the patient and his  Malou Abreu over a three way call and pt agreed to go back to [facility] for therapy.     Shameka Palm RN, BSN, [address]  Case Management Department  [phone]

## 2022-09-15 LAB
ALBUMIN SERPL-MCNC: 2.3 G/DL (ref 3.4–5)
ALP BLD-CCNC: 343 U/L (ref 40–129)
ALT SERPL-CCNC: 26 U/L (ref 10–40)
ANION GAP SERPL CALCULATED.3IONS-SCNC: 10 MMOL/L (ref 3–16)
AST SERPL-CCNC: 89 U/L (ref 15–37)
BASOPHILS ABSOLUTE: 0 K/UL (ref 0–0.2)
BASOPHILS RELATIVE PERCENT: 0.9 %
BILIRUB SERPL-MCNC: 4.4 MG/DL (ref 0–1)
BILIRUBIN DIRECT: 2.8 MG/DL (ref 0–0.3)
BILIRUBIN, INDIRECT: 1.6 MG/DL (ref 0–1)
BUN BLDV-MCNC: 5 MG/DL (ref 7–20)
CALCIUM SERPL-MCNC: 7.9 MG/DL (ref 8.3–10.6)
CHLORIDE BLD-SCNC: 105 MMOL/L (ref 99–110)
CO2: 21 MMOL/L (ref 21–32)
CREAT SERPL-MCNC: 0.6 MG/DL (ref 0.8–1.3)
EOSINOPHILS ABSOLUTE: 0.1 K/UL (ref 0–0.6)
EOSINOPHILS RELATIVE PERCENT: 2.7 %
GFR AFRICAN AMERICAN: >60
GFR NON-AFRICAN AMERICAN: >60
GLUCOSE BLD-MCNC: 93 MG/DL (ref 70–99)
HCT VFR BLD CALC: 25.3 % (ref 40.5–52.5)
HEMOGLOBIN: 8.6 G/DL (ref 13.5–17.5)
LYMPHOCYTES ABSOLUTE: 0.9 K/UL (ref 1–5.1)
LYMPHOCYTES RELATIVE PERCENT: 28 %
MCH RBC QN AUTO: 36.1 PG (ref 26–34)
MCHC RBC AUTO-ENTMCNC: 33.7 G/DL (ref 31–36)
MCV RBC AUTO: 106.8 FL (ref 80–100)
MONOCYTES ABSOLUTE: 0.4 K/UL (ref 0–1.3)
MONOCYTES RELATIVE PERCENT: 11 %
NEUTROPHILS ABSOLUTE: 1.9 K/UL (ref 1.7–7.7)
NEUTROPHILS RELATIVE PERCENT: 57.4 %
PDW BLD-RTO: 15.7 % (ref 12.4–15.4)
PLATELET # BLD: 79 K/UL (ref 135–450)
PMV BLD AUTO: 9.8 FL (ref 5–10.5)
POTASSIUM REFLEX MAGNESIUM: 3.7 MMOL/L (ref 3.5–5.1)
RBC # BLD: 2.37 M/UL (ref 4.2–5.9)
SODIUM BLD-SCNC: 136 MMOL/L (ref 136–145)
TOTAL PROTEIN: 5.2 G/DL (ref 6.4–8.2)
WBC # BLD: 3.3 K/UL (ref 4–11)

## 2022-09-15 PROCEDURE — 80076 HEPATIC FUNCTION PANEL: CPT

## 2022-09-15 PROCEDURE — 97535 SELF CARE MNGMENT TRAINING: CPT

## 2022-09-15 PROCEDURE — 2580000003 HC RX 258: Performed by: INTERNAL MEDICINE

## 2022-09-15 PROCEDURE — 6370000000 HC RX 637 (ALT 250 FOR IP)

## 2022-09-15 PROCEDURE — 99232 SBSQ HOSP IP/OBS MODERATE 35: CPT | Performed by: INTERNAL MEDICINE

## 2022-09-15 PROCEDURE — 2580000003 HC RX 258

## 2022-09-15 PROCEDURE — 80048 BASIC METABOLIC PNL TOTAL CA: CPT

## 2022-09-15 PROCEDURE — 85025 COMPLETE CBC W/AUTO DIFF WBC: CPT

## 2022-09-15 PROCEDURE — 6360000002 HC RX W HCPCS: Performed by: INTERNAL MEDICINE

## 2022-09-15 PROCEDURE — 1200000000 HC SEMI PRIVATE

## 2022-09-15 PROCEDURE — 97530 THERAPEUTIC ACTIVITIES: CPT

## 2022-09-15 RX ADMIN — GUAIFENESIN SYRUP AND DEXTROMETHORPHAN 5 ML: 100; 10 SYRUP ORAL at 05:20

## 2022-09-15 RX ADMIN — GABAPENTIN 300 MG: 300 CAPSULE ORAL at 21:00

## 2022-09-15 RX ADMIN — MEROPENEM 1000 MG: 1 INJECTION, POWDER, FOR SOLUTION INTRAVENOUS at 00:47

## 2022-09-15 RX ADMIN — SODIUM CHLORIDE, PRESERVATIVE FREE 10 ML: 5 INJECTION INTRAVENOUS at 08:18

## 2022-09-15 RX ADMIN — MEROPENEM 1000 MG: 1 INJECTION, POWDER, FOR SOLUTION INTRAVENOUS at 08:23

## 2022-09-15 RX ADMIN — TAMSULOSIN HYDROCHLORIDE 0.4 MG: 0.4 CAPSULE ORAL at 08:16

## 2022-09-15 RX ADMIN — PANTOPRAZOLE SODIUM 40 MG: 40 TABLET, DELAYED RELEASE ORAL at 05:20

## 2022-09-15 RX ADMIN — MIDODRINE HYDROCHLORIDE 5 MG: 5 TABLET ORAL at 08:16

## 2022-09-15 RX ADMIN — DIPHENHYDRAMINE HYDROCHLORIDE 25 MG: 25 TABLET ORAL at 00:47

## 2022-09-15 RX ADMIN — GABAPENTIN 300 MG: 300 CAPSULE ORAL at 08:17

## 2022-09-15 RX ADMIN — DIPHENHYDRAMINE HYDROCHLORIDE 25 MG: 25 TABLET ORAL at 21:08

## 2022-09-15 RX ADMIN — RIFAXIMIN 550 MG: 550 TABLET ORAL at 09:38

## 2022-09-15 RX ADMIN — MEROPENEM 1000 MG: 1 INJECTION, POWDER, FOR SOLUTION INTRAVENOUS at 17:49

## 2022-09-15 RX ADMIN — SODIUM CHLORIDE, PRESERVATIVE FREE 10 ML: 5 INJECTION INTRAVENOUS at 08:17

## 2022-09-15 RX ADMIN — RIFAXIMIN 550 MG: 550 TABLET ORAL at 21:00

## 2022-09-15 ASSESSMENT — PAIN - FUNCTIONAL ASSESSMENT: PAIN_FUNCTIONAL_ASSESSMENT: ACTIVITIES ARE NOT PREVENTED

## 2022-09-15 ASSESSMENT — PAIN SCALES - GENERAL
PAINLEVEL_OUTOF10: 0
PAINLEVEL_OUTOF10: 3
PAINLEVEL_OUTOF10: 3

## 2022-09-15 ASSESSMENT — PAIN DESCRIPTION - ONSET: ONSET: ON-GOING

## 2022-09-15 ASSESSMENT — PAIN DESCRIPTION - LOCATION: LOCATION: LEG

## 2022-09-15 ASSESSMENT — PAIN DESCRIPTION - DESCRIPTORS: DESCRIPTORS: ACHING

## 2022-09-15 ASSESSMENT — PAIN DESCRIPTION - ORIENTATION: ORIENTATION: RIGHT

## 2022-09-15 ASSESSMENT — PAIN DESCRIPTION - FREQUENCY: FREQUENCY: INTERMITTENT

## 2022-09-15 ASSESSMENT — PAIN DESCRIPTION - PAIN TYPE: TYPE: ACUTE PAIN

## 2022-09-15 NOTE — CARE COORDINATION
CM  called  Freida Moore at  [facility],  [phone],   and still do not have  pre cert  at this time  ,     -  CM  moved  [place] [facility] transport o  9/16  @ 1630  tomorrow in anticipation of  receipt . CM  updated  pt and  AMBROSE Pulido. Electronically signed by Reji Lopez RN on 9/15/2022 at 4:21 PM     ++++++++++++++++++++++++++++++++++++++++++++++++       Case Management Assessment            Discharge Note                    Date / Time of Note: 9/15/2022 9:14 AM                  Discharge Note Completed by: Reji Lopez RN    Patient Name: Toña Cadena   YOB: 1955  Diagnosis: Hypomagnesemia [U96.12]  Alcoholic cirrhosis of liver without ascites ([facility] [address] [K70.30]  Septic shock ([facility] [address] [A41.9, R65.21]  Sepsis, due to unspecified organism, unspecified whether acute organ dysfunction present ([facility] [address] [A41.9]  COVID [U07.1]   Date / Time: 9/10/2022  1:04 PM    Current PCP: No primary care provider on file. Clinic patient: No    Hospitalization in the last 30 days: No    Advance Directives:  Code Status: Full Code  [place] DNR form completed and on chart: No    Financial:  Payor: Alessandra Hernández / Plan: Jason Howe / Product Type: *No Product type* /      Pharmacy:  No Pharmacies [facility] [place] medications?:    Assistance provided by Case Management: None at this time    Does patient want to participate in local refill/ meds to beds program?:      Meds To Beds General Rules:  1. Can ONLY be done Monday- Friday between 8:30am-5pm  2. Prescription(s) must be in pharmacy by 3pm to be filled same day  3. Copy of patient's insurance/ prescription drug card and patient face sheet must be sent along with the prescription(s)  4. Cost of Rx cannot be added to hospital bill. If financial assistance is needed, please contact unit  or ;  or  CANNOT provide pharmacy voucher for patients co-pays  5.  Patients can then  the prescription on their way out of the hospital at discharge, or pharmacy can deliver to the bedside if staff is available. (payment due at time of pick-up or delivery - cash, check, or card accepted)     Able to afford home medications/ co-pay costs: Yes    ADLS:  Current PT AM-PAC Score: 14 /24  Current OT AM-PAC Score: 15 /24      DISCHARGE Disposition:     [facility]  Address: [address] [place] [address], [address] [facility], [address] [facility]  Phone: [phone]    FAx:  [phone]      LOC at discharge: Skilled  [address] [place] Completed: Yes    Notification completed in HENS/PAS?:  Yes : CM has completed HENS online through secure website for SNF admission at [facility] .    Document ID #: Document ID : [id number]    IMM Completed:   Not Indicated    Transportation:  Transportation PLAN for discharge: EMS transportation   Mode of Transport: Ambulance stretcher - [facility]  Reason for medical transport: Bed confined: Meets the following criteria 1) unable to get out of bed without assistance or ambulate, 2) unable to safely sit up in a wheelchair, 3) unable to maintain erect seating position in a chair for time needed for transport  Name of Transport Company:  [employer]: [phone]  Time of Transport: 1630    Transport form completed: Yes    Home Care:  [address] ordered at discharge: No  [address]: Not Applicable  Orders faxed: No    Durable Medical Equipment:  DME Provider: defer  Equipment obtained during hospitalization: defer    Home Oxygen and Respiratory Equipment:  Oxygen needed at discharge?: Not [address]: Not Applicable  Portable tank available for discharge?: No    Dialysis:  Dialysis patient: No    Dialysis Center:  Not Applicable    Hospice Services:  Location: Not Applicable  Agency: Not Applicable    Consents signed: No    Referrals made at [facility] for outpatient continued care:  Not Applicable    Additional CM Notes:     CM  confirmed  d./c to [facility] pre cert  pending ;    HENS submitted    AMBROSE Joyce to call report    CM to fax  [address] .     The Plan for Transition of Care is related to the following treatment goals of Hypomagnesemia [H62.30]  Alcoholic cirrhosis of liver without ascites ([facility] [address] [K70.30]  Septic shock ([facility] [address] [A41.9, R65.21]  Sepsis, due to unspecified organism, unspecified whether acute organ dysfunction present ([facility] [address] [A41.9]  COVID [U07.1]    The Patient and/or patient representative [facility] and his family were provided with a choice of provider and agrees with the discharge plan Yes    Freedom of choice list was provided with basic dialogue that supports the patient's individualized plan of care/goals and shares the quality data associated with the providers.  Yes    Care Transitions patient: No    Jocelyn Ramos RN  [facility]  Case Management Department  Ph: [phone]

## 2022-09-15 NOTE — DISCHARGE INSTR - DIET
Good nutrition is important when healing from an illness, injury, or surgery. Follow any nutrition recommendations given to you during your hospital stay. If you were given an oral nutrition supplement while in the hospital, continue to take this supplement at home. You can take it with meals, in-between meals, and/or before bedtime. These supplements can be purchased at most local grocery stores, pharmacies, and chain [employer]stores. If you have any questions about your diet or nutrition, call the hospital and ask for the dietitian.   Review Teaching sheet on diet in the presence of liver failure

## 2022-09-15 NOTE — PLAN OF CARE
Problem: Discharge Planning  Goal: Discharge to home or other facility with appropriate resources  9/15/2022 0056 by Jose Johnson RN  Outcome: Progressing  9/14/2022 1458 by Idris Hutton RN  Outcome: Progressing  Flowsheets (Taken 9/14/2022 1455)  Discharge to home or other facility with appropriate resources:   Identify barriers to discharge with patient and caregiver   Arrange for needed discharge resources and transportation as appropriate   Identify discharge learning needs (meds, wound care, etc)  Note: Pt getting IV antibiotics for sepsis. PICC placed yesterday for long-term antibiotic use. V/S stable and WNL. Pain controlled. Problem: Pain  Goal: Verbalizes/displays adequate comfort level or baseline comfort level  9/15/2022 0056 by Jose Johnson RN  Outcome: Progressing  9/14/2022 1458 by Idris Hutton RN  Outcome: Progressing  Flowsheets (Taken 9/14/2022 1455)  Verbalizes/displays adequate comfort level or baseline comfort level:   Encourage patient to monitor pain and request assistance   Assess pain using appropriate pain scale   Administer analgesics based on type and severity of pain and evaluate response   Implement non-pharmacological measures as appropriate and evaluate response  Note: Pt able to utilize numeric pain scale to report pain. Rest, repositioning, and environmental changes were used to help manage pain. Pt reports satisfaction w/ pain management plan. Problem: Safety - Adult  Goal: Free from fall injury  9/15/2022 0056 by Jose Johnson RN  Outcome: Progressing  9/14/2022 1458 by Idris Hutton RN  Outcome: Progressing  Note: Pt is a Fall Risk. See Esta Blazing Fall Risk Score. Pt bed in low position and side rails up. Call light and belongings in reach. Pt encouraged to call for assistance. Will continue with hourly rounds for PO intake, pain needs, toileting, and repositioning as needed.        Problem: Nutrition Deficit:  Goal: Optimize nutritional status  Outcome: Progressing

## 2022-09-15 NOTE — PLAN OF CARE
Problem: Discharge Planning  Goal: Discharge to home or other facility with appropriate resources  9/15/2022 1233 by Atiya Franco RN  Outcome: Adequate for Discharge  9/15/2022 0056 by Yaneli Omer RN  Outcome: Progressing     Problem: Pain  Goal: Verbalizes/displays adequate comfort level or baseline comfort level  9/15/2022 1233 by Atiya Franco RN  Outcome: Adequate for Discharge  9/15/2022 0056 by Yaneli Omer RN  Outcome: Progressing     Problem: Nutrition Deficit:  Goal: Optimize nutritional status  9/15/2022 1233 by Atiya Franco RN  Outcome: Adequate for Discharge  9/15/2022 0056 by Yaneli Omer RN  Outcome: Progressing     Problem: Skin/Tissue Integrity  Goal: Absence of new skin breakdown  Description: 1. Monitor for areas of redness and/or skin breakdown  2. Assess vascular access sites hourly  3. Every 4-6 hours minimum:  Change oxygen saturation probe site  4. Every 4-6 hours:  If on nasal continuous positive airway pressure, respiratory therapy assess nares and determine need for appliance change or resting period.   Outcome: Progressing     Problem: Safety - Adult  Goal: Free from fall injury  9/15/2022 1233 by Atiya Franco RN  Outcome: Progressing  9/15/2022 0056 by Yaneli Omer RN  Outcome: Progressing

## 2022-09-15 NOTE — PROGRESS NOTES
Occupational Therapy  Occupational Therapy  Daily Treatment Note  Patient Name: Zac Stoner  MRN: [id number]    Assessment:   Pt requiring increased time and rest breaks during tasks. ADLs completed seated EOB washing up and UE dressing requiring increased time and CGA. Functional mobility completed in room with RW and CGA to Min A with fatigue. Pt would benefit from inpt OT. Continue OT per POC. Discharge Recommendations:     Zac Stoner scored a 16/24 on the AM-PAC ADL Inpatient form. Current research shows that an AM-PAC score of 17 or less is typically not associated with a discharge to the patient's home setting. Based on the patient's AM-PAC score and their current ADL deficits, it is recommended that the patient have 3-5 sessions per week of Occupational Therapy at d/c to increase the patient's independence. Please see assessment section for further patient specific details. If patient discharges prior to next session this note will serve as a discharge summary. Please see below for the latest assessment towards goals. Equipment Needs:      Chart Reviewed: Yes       Other position/activity restrictions: up with asssitance ; Droplet plus     Additional Pertinent Hx: aZc Stoner is a 79 y.o. male with PMH of alcoholic liver cirrhosis, GERD, hyponatremia who presents to the ED from [facility] with shortness of breath and chills. Patient reported he has been feeling sick for the past couple of days was diagnosed with COVID on 8/29/2022      Diagnosis: COVID-19  Treatment Diagnosis: decreased independence with ADLs and fx mobility, decreased endurance    Subjective:   Pt met supine in bed requiring encouragement for participation. Pt cooperative once up. Pain: no pain noted.  Pt groaning when coughing    Social/Functional History  Lives With: Alone  Type of Home: Apartment  Home Layout: One level  Home Access: Elevator, Level entry  Bathroom Shower/Tub: Tub/Shower unit (however pt typically bathes at the sink)  Bathroom Toilet: Standard  Bathroom Accessibility: Accessible  Home Equipment: Alert Button, Walker, rolling, Wheelchair-manual  Has the patient had two or more falls in the past year or any fall with injury in the past year?: Yes (\"I had a fall at home\"- sideways and hit the floor and was on the floor for 19 hrs)  Receives Help From: Home health (home health aid comes 2x a week)  ADL Assistance: [address]: Needs assistance  Laundry: Stand by  Cleaning:  (home health aid)  Shopping:  (home health aid performs)  Homemaking Responsibilities: Yes  Meal Prep Responsibility: Primary  Laundry Responsibility: Primary  Bill Paying/Finance Responsibility: Primary  Ambulation Assistance: Independent (with 2WW)  Transfer Assistance: Independent  Active : No  Patient's  Info: \" I don't drive \"  Mode of Transportation: Car  Leisure & Hobbies: Watch TV and do crosswords  Additional Comments: Pt does not have 24hr or family/friends in the area; Prior to hosptial admission: Pt was at [facility] who reported the patient was admitted at the facility on 9/2/2022 after he was discharged from the [facility].  Patient was admitted to the [facility] for hyponatremia and was discharged with lactulose and rifaximin.   Prior Function  Receives Help From: Home health (home health aid comes 2x a week)  ADL Assistance: [address]: Needs assistance  Laundry: Stand by  Cleaning:  (home health aid)  Shopping:  (home health aid performs)  Ambulation Assistance: Independent (with 2WW)  Transfer Assistance: Independent  Additional Comments: Pt does not have 24hr or family/friends in the area; Prior to hosptial admission: Pt was at [facility] who reported the patient was admitted at the facility on 9/2/2022 after he was discharged from the [facility].  Patient was admitted to the [facility] for hyponatremia and was discharged with lactulose and rifaximin. Objective:    Cognition/Orientation:  Oriented x4    Bed mobility   Supine to sit: CGA to Min  A with effortful movement requiring increased time  Scooting: CGA with increased time and effort    Functional Mobility   Sit to Stand: Min A from EOB to RW  Stand to Sit: CGA with VCS for hand placement  Bed to Chair Transfer:  CGA to Min A with Vcs for RW management and positioning. Pt with fatigue after transfer requiring rest break  Other: Functional mobility short distance in room with CGA to Min A and RW. Pt with SOB and fatigue after short distance    ADLs   Grooming: SBA washing face and hands seated on EOB. Pt declining mobility to bathroom for clean up due to fatigue  Bathing: SBA UE washing seated  EOB. Vcs for line awarness  UB dressing: CGA donning gown seated EOB  LB dressing: decline      Activity Tolerance:  Pt fatigue quickly with minimal activity    Patient Education:   Safe transfers, activity promotion, role of OT - Pt will need reinforcement      Safety Devices in Place:  Pt left seated in recliner chair in company of RN with Alarm on and call light inreach. Second Session Therapy Time:   Individual Concurrent Group Co-treatment   Time In  913         Time Out  940         Minutes  27           Timed Code Treatment Minutes: 27     Total Treatment Minutes:         Timed Code Treatment Minutes:    Total Treatment Time:     Goals: (as determined and assessed by primary OT)   Short Term Goals  Time Frame for Short term goals: by discharge  Short Term Goal 1: Pt will perform toilet transfer with CGA - not met  Short Term Goal 2: Pt will perform standing endurance for ADLs for 5 min with CGA - ongoing  Short Term Goal 3: Pt will perform LB dressing with AE as needed and min A - ongoing  Short Term Goal 4: Pt will participate in rear isidra care with VC and CGA - ongoing         Plan:      Times per Week: 2-5x        If patient is discharged prior to next treatment, this note will serve as the discharge summary.       [address] [place] [address], Ne, VALENCIA/L

## 2022-09-15 NOTE — PROGRESS NOTES
05:20 AM    BILIDIR 2.8 09/15/2022 05:20 AM    IBILI 1.6 09/15/2022 05:20 AM    LABALBU 2.3 09/15/2022 05:20 AM       Micro:  -Urine culture taken 9/10 growing ESBL  -Blood cultures taken 9/10 also growing ESBL              -Resistant to ampicillin, cefepime, ceftriaxone, Cipro, levo, Bactrim  -Sensitive to Unasyn with WILFRIDO of 8, sensitive to ertapenem and gentamicin  -Repeat blood cultures (9/13) NGTD     Imaging:   CTA chest/abd/pel (9/10): Incidental findings include fatty liver, borderline in size and moderate splenomegaly       Cholelithiasis with trace free fluid in the abdomen along left paracolic gutter of uncertain etiology       Scattered diverticulosis but no sign of any acute or active diverticulitis or obstruction       Bladder wall thickening appreciated. Fat-containing inguinal hernias noted bilaterally       Scheduled Meds:   sodium chloride flush  5-40 mL IntraVENous 2 times per day    meropenem  1,000 mg IntraVENous Q8H    sodium chloride flush  5-40 mL IntraVENous 2 times per day    [Held by provider] enoxaparin  30 mg SubCUTAneous BID    gabapentin  300 mg Oral BID    [Held by provider] lactulose  20 g Oral TID    midodrine  5 mg Oral TID WC    pantoprazole  40 mg Oral QAM AC    tamsulosin  0.4 mg Oral Daily    rifAXIMin  550 mg Oral BID       Continuous Infusions:   sodium chloride      sodium chloride 25 mL/hr at 09/14/22 1803       PRN Meds:  guaiFENesin-dextromethorphan, sodium chloride flush, sodium chloride, benzonatate, sodium chloride flush, sodium chloride, acetaminophen **OR** acetaminophen, diphenhydrAMINE, ondansetron, prochlorperazine      Assessment:     César Ballard is a 79year old male with PMHx significant for GERD, hypertension, alcoholic liver cirrhosis, and recent COVID infection (8/29), who presented to the [facility]. on 9/10 for shortness of breath and generalized weakness. Patient also notes he was increasingly jaundiced.     #Urosepsis secondary to ESBL E coli reviewed       Discussed with pt  Ruddy Forrest MD     INFUSION ORDERS:  Invanz 1 gm iv daily through 9/23  - Diagnosis - E coli UTI / bacteremia  - First dose given in hospital  - L PICC  - placed 9/13  - Disposition / date discharge - [facility] CBC w diff, CMP, ESR, CRP every Mon or Melum 64 result to [phone]  - Call with antibiotic / infusion issues, [phone]  - Call with any change in status, transfer in or out of a facility or to hospital - [phone]  - No f/u in outpatient ID office necessary

## 2022-09-16 VITALS
TEMPERATURE: 98.1 F | RESPIRATION RATE: 20 BRPM | WEIGHT: 216.49 LBS | BODY MASS INDEX: 32.07 KG/M2 | DIASTOLIC BLOOD PRESSURE: 72 MMHG | OXYGEN SATURATION: 100 % | HEIGHT: 69 IN | HEART RATE: 93 BPM | SYSTOLIC BLOOD PRESSURE: 125 MMHG

## 2022-09-16 LAB
ANION GAP SERPL CALCULATED.3IONS-SCNC: 10 MMOL/L (ref 3–16)
BASOPHILS ABSOLUTE: 0 K/UL (ref 0–0.2)
BASOPHILS RELATIVE PERCENT: 1 %
BUN BLDV-MCNC: 5 MG/DL (ref 7–20)
CALCIUM SERPL-MCNC: 8 MG/DL (ref 8.3–10.6)
CHLORIDE BLD-SCNC: 105 MMOL/L (ref 99–110)
CO2: 22 MMOL/L (ref 21–32)
CREAT SERPL-MCNC: <0.5 MG/DL (ref 0.8–1.3)
EOSINOPHILS ABSOLUTE: 0.1 K/UL (ref 0–0.6)
EOSINOPHILS RELATIVE PERCENT: 2.8 %
GFR AFRICAN AMERICAN: >60
GFR NON-AFRICAN AMERICAN: >60
GLUCOSE BLD-MCNC: 112 MG/DL (ref 70–99)
GLUCOSE BLD-MCNC: 122 MG/DL (ref 70–99)
HCT VFR BLD CALC: 25.6 % (ref 40.5–52.5)
HEMOGLOBIN: 8.7 G/DL (ref 13.5–17.5)
LYMPHOCYTES ABSOLUTE: 0.7 K/UL (ref 1–5.1)
LYMPHOCYTES RELATIVE PERCENT: 24.5 %
MCH RBC QN AUTO: 35.9 PG (ref 26–34)
MCHC RBC AUTO-ENTMCNC: 33.8 G/DL (ref 31–36)
MCV RBC AUTO: 106.2 FL (ref 80–100)
MONOCYTES ABSOLUTE: 0.4 K/UL (ref 0–1.3)
MONOCYTES RELATIVE PERCENT: 13.7 %
NEUTROPHILS ABSOLUTE: 1.7 K/UL (ref 1.7–7.7)
NEUTROPHILS RELATIVE PERCENT: 58 %
PDW BLD-RTO: 15.7 % (ref 12.4–15.4)
PERFORMED ON: ABNORMAL
PLATELET # BLD: 92 K/UL (ref 135–450)
PMV BLD AUTO: 9.4 FL (ref 5–10.5)
POTASSIUM REFLEX MAGNESIUM: 3.6 MMOL/L (ref 3.5–5.1)
RBC # BLD: 2.41 M/UL (ref 4.2–5.9)
SODIUM BLD-SCNC: 137 MMOL/L (ref 136–145)
WBC # BLD: 3 K/UL (ref 4–11)

## 2022-09-16 PROCEDURE — 2580000003 HC RX 258: Performed by: INTERNAL MEDICINE

## 2022-09-16 PROCEDURE — 85025 COMPLETE CBC W/AUTO DIFF WBC: CPT

## 2022-09-16 PROCEDURE — 6360000002 HC RX W HCPCS

## 2022-09-16 PROCEDURE — 2580000003 HC RX 258

## 2022-09-16 PROCEDURE — 6370000000 HC RX 637 (ALT 250 FOR IP)

## 2022-09-16 PROCEDURE — 6360000002 HC RX W HCPCS: Performed by: INTERNAL MEDICINE

## 2022-09-16 PROCEDURE — 80048 BASIC METABOLIC PNL TOTAL CA: CPT

## 2022-09-16 RX ADMIN — PANTOPRAZOLE SODIUM 40 MG: 40 TABLET, DELAYED RELEASE ORAL at 05:32

## 2022-09-16 RX ADMIN — MEROPENEM 1000 MG: 1 INJECTION, POWDER, FOR SOLUTION INTRAVENOUS at 01:03

## 2022-09-16 RX ADMIN — ERTAPENEM 1000 MG: 1 INJECTION INTRAMUSCULAR; INTRAVENOUS at 14:06

## 2022-09-16 RX ADMIN — GABAPENTIN 300 MG: 300 CAPSULE ORAL at 09:23

## 2022-09-16 RX ADMIN — ACETAMINOPHEN 650 MG: 325 TABLET, FILM COATED ORAL at 13:25

## 2022-09-16 RX ADMIN — DIPHENHYDRAMINE HYDROCHLORIDE 25 MG: 25 TABLET ORAL at 13:25

## 2022-09-16 RX ADMIN — SODIUM CHLORIDE, PRESERVATIVE FREE 10 ML: 5 INJECTION INTRAVENOUS at 09:24

## 2022-09-16 RX ADMIN — MEROPENEM 1000 MG: 1 INJECTION, POWDER, FOR SOLUTION INTRAVENOUS at 09:23

## 2022-09-16 RX ADMIN — TAMSULOSIN HYDROCHLORIDE 0.4 MG: 0.4 CAPSULE ORAL at 09:23

## 2022-09-16 RX ADMIN — RIFAXIMIN 550 MG: 550 TABLET ORAL at 09:23

## 2022-09-16 ASSESSMENT — PAIN DESCRIPTION - ORIENTATION: ORIENTATION: RIGHT

## 2022-09-16 ASSESSMENT — ENCOUNTER SYMPTOMS
COUGH: 1
NAUSEA: 0
DIARRHEA: 0
VOMITING: 0
ABDOMINAL PAIN: 0

## 2022-09-16 ASSESSMENT — PAIN SCALES - GENERAL
PAINLEVEL_OUTOF10: 7
PAINLEVEL_OUTOF10: 7

## 2022-09-16 ASSESSMENT — PAIN DESCRIPTION - LOCATION: LOCATION: LEG

## 2022-09-16 NOTE — CARE COORDINATION
CM  called  Az Cabrera at  [facility],  [phone],   and still do not have  pre cert  at this time  ,  CM  explained he is from their  facility  and  needs to return at least under his  Medicaid  if  possible and he was  agreeable to have him return today . CM  Faxed  LOREN/AVS   AMBROSE Easley to call report  :  Cm  requested  Pt get  1 st dose  IV Invanz  prior to  d/c if he did not already receive  . Electronically signed by Morenita Carney RN on 9/16/2022 at 12:55 PM         ++++++++++++++++++++++++++++++++++++++++++++++++       Case Management Assessment            Discharge Note                    Date / Time of Note: 9/16/2022 12:53 PM                  Discharge Note Completed by: Morenita Carney RN    Patient Name: Kacie Arana   YOB: 1955  Diagnosis: Hypomagnesemia [I18.51]  Alcoholic cirrhosis of liver without ascites ([facility] [K70.30]  Septic shock ([facility] [A41.9, R65.21]  Sepsis, due to unspecified organism, unspecified whether acute organ dysfunction present ([facility] [A41.9]  COVID [U07.1]   Date / Time: 9/10/2022  1:04 PM    Current PCP: No primary care provider on file. Clinic patient: No    Hospitalization in the last 30 days: No    Advance Directives:  Code Status: Full Code  [address] DNR form completed and on chart: No    Financial:  Payor: Diego Arnold / Plan: Ton Rodriguez / Product Type: *No Product type* /      Pharmacy:  No Pharmacies [address] [facility] medications?:    Assistance provided by Case Management: None at this time    Does patient want to participate in local refill/ meds to beds program?:      Meds To Beds General Rules:  1. Can ONLY be done Monday- Friday between 8:30am-5pm  2. Prescription(s) must be in pharmacy by 3pm to be filled same day  3. Copy of patient's insurance/ prescription drug card and patient face sheet must be sent along with the prescription(s)  4. Cost of Rx cannot be added to hospital bill.  If financial assistance is needed, please contact unit  or ;  or  CANNOT provide pharmacy voucher for patients co-pays  5. Patients can then  the prescription on their way out of the hospital at discharge, or pharmacy can deliver to the bedside if staff is available. (payment due at time of pick-up or delivery - cash, check, or card accepted)     Able to afford home medications/ co-pay costs: Yes    ADLS:  Current PT AM-PAC Score: 14 /24  Current OT AM-PAC Score: 16 /24      DISCHARGE Disposition:     [facility]  Address: [address] [place], [place] [address]Gabriella modi, [address]  Phone: [phone]    FAx:  [phone]      LOC at discharge: [place] [address] [address] Completed: Yes    Notification completed in HENS/PAS?:  Yes : CM has completed HENS online through secure website for SNF admission at [facility] .    Document ID #: Document ID : [id number]    IMM Completed:   Not Indicated    Transportation:  Transportation PLAN for discharge: EMS transportation   Mode of Transport: Ambulance stretcher - [facility]  Reason for medical transport: Bed confined: Meets the following criteria 1) unable to get out of bed without assistance or ambulate, 2) unable to safely sit up in a wheelchair, 3) unable to maintain erect seating position in a chair for time needed for transport  [phone]  Time of Transport: [employer] form completed: Yes    Home Care:  [address] ordered at discharge: No  [address]: Not Applicable  Orders faxed: No    Durable Medical Equipment:  DME Provider: defer  Equipment obtained during hospitalization: defer    Home Oxygen and Respiratory Equipment:  Oxygen needed at discharge?: Not [address]: Not Applicable  Portable tank available for discharge?: No    Dialysis:  Dialysis patient: No    Dialysis Center:  Not Applicable    Hospice Services:  Location: Not Applicable  Agency: Not Applicable    Consents signed: No    Referrals made at [facility] for outpatient continued care:  Not Applicable    Additional CM Notes:     CM  confirmed  d./c to [facility] pre cert  pending ;    LIT submitted  previously   RN Basia Maddox to call report    CM to fax  LOREN AVS .     The Plan for Transition of Care is related to the following treatment goals of Hypomagnesemia [M71.79]  Alcoholic cirrhosis of liver without ascites ([facility] [address] [K70.30]  Septic shock ([facility] [address] [A41.9, R65.21]  Sepsis, due to unspecified organism, unspecified whether acute organ dysfunction present ([facility] [address] [facility] [A41.9]  COVID [U07.1]    The Patient and/or patient representative Larry Grady and his family were provided with a choice of provider and agrees with the discharge plan Yes    Freedom of choice list was provided with basic dialogue that supports the patient's individualized plan of care/goals and shares the quality data associated with the providers.  Yes    Care Transitions patient: No    Mariel Mera RN  [facility]  Case Management Department  Ph: [phone]

## 2022-09-16 NOTE — PROGRESS NOTES
Progress Note    Admit Date: 9/10/2022  Day: 2  Diet: ADULT DIET; Regular; Low Sodium (2 gm)  ADULT ORAL NUTRITION SUPPLEMENT; Breakfast, Lunch, Dinner; Low Calorie/High Protein Oral Supplement    CC: SOB    Interval history: NAEON. Patient feels well other than continued cough and RUQ pain. Ready to go to [facility]. Medications:     Scheduled Meds:   sodium chloride flush  5-40 mL IntraVENous 2 times per day    meropenem  1,000 mg IntraVENous Q8H    sodium chloride flush  5-40 mL IntraVENous 2 times per day    [Held by provider] enoxaparin  30 mg SubCUTAneous BID    gabapentin  300 mg Oral BID    [Held by provider] lactulose  20 g Oral TID    midodrine  5 mg Oral TID WC    pantoprazole  40 mg Oral QAM AC    tamsulosin  0.4 mg Oral Daily    rifAXIMin  550 mg Oral BID     Continuous Infusions:   sodium chloride Stopped (09/15/22 1303)    sodium chloride Stopped (09/15/22 1303)     PRN Meds:guaiFENesin-dextromethorphan, sodium chloride flush, sodium chloride, benzonatate, sodium chloride flush, sodium chloride, acetaminophen **OR** acetaminophen, diphenhydrAMINE, ondansetron, prochlorperazine    Objective:   Vitals:   T-max:  Patient Vitals for the past 8 hrs:   BP Temp Temp src Pulse Resp SpO2 Weight   09/16/22 0456 -- -- -- -- -- -- 216 lb 7.9 oz (98.2 kg)   09/16/22 0422 124/76 98 °F (36.7 °C) Oral 86 18 99 % --   09/16/22 0055 129/76 98 °F (36.7 °C) Oral 88 18 100 % --         Intake/Output Summary (Last 24 hours) at 9/16/2022 2393  Last data filed at 9/16/2022 0755  Gross per 24 hour   Intake 620 ml   Output --   Net 620 ml         Review of Systems   Constitutional:  Negative for fever. HENT:  Negative for congestion. Respiratory:  Positive for cough. Cardiovascular:  Negative for chest pain and palpitations. Gastrointestinal:  Negative for abdominal pain, diarrhea, nausea and vomiting. Genitourinary:  Negative for dysuria. Musculoskeletal:  Negative for arthralgias and myalgias. Neurological:  Negative for headaches. Physical Exam  Constitutional:       General: He is not in acute distress. Appearance: Normal appearance. He is obese. He is not ill-appearing or diaphoretic. HENT:      Head: Normocephalic and atraumatic. Eyes:      Extraocular Movements: Extraocular movements intact. Cardiovascular:      Rate and Rhythm: Normal rate and regular rhythm. Heart sounds: No murmur heard. No friction rub. No gallop. Pulmonary:      Breath sounds: No wheezing, rhonchi or rales. Abdominal:      General: There is distension. Tenderness: There is no abdominal tenderness. There is no guarding or rebound. Musculoskeletal:      Right lower leg: No edema. Left lower leg: No edema. Skin:     General: Skin is warm. Findings: Lesion and rash present. Comments: Multiple scratches on abdomen. Multiple scratches on RLE   Neurological:      Mental Status: He is alert. Mental status is at baseline. LABS:    CBC:   Recent Labs     09/14/22  0518 09/15/22  0520 09/16/22  0621   WBC 3.5* 3.3* 3.0*   HGB 8.4* 8.6* 8.7*   HCT 25.6* 25.3* 25.6*   PLT 84* 79* 92*   .1* 106.8* 106.2*       Renal:    Recent Labs     09/14/22  0518 09/15/22  0520 09/16/22  0621   * 136 137   K 3.9 3.7 3.6    105 105   CO2 19* 21 22   BUN 6* 5* 5*   CREATININE 0.6* 0.6* <0.5*   GLUCOSE 101* 93 112*   CALCIUM 8.0* 7.9* 8.0*   ANIONGAP 10 10 10       Hepatic:   Recent Labs     09/14/22 0518 09/15/22  0520   AST 94* 89*   ALT 26 26   BILITOT 4.8* 4.4*   BILIDIR 3.3* 2.8*   PROT 5.2* 5.2*   LABALBU 2.4* 2.3*   ALKPHOS 309* 343*       Troponin:   No results for input(s): TROPONINI in the last 72 hours. BNP: No results for input(s): BNP in the last 72 hours. Lipids: No results for input(s): CHOL, HDL in the last 72 hours.     Invalid input(s): LDLCALCU, TRIGLYCERIDE  ABGs:  No results for input(s): PHART, UBN8MYC, PO2ART, NTR0FQI, BEART, THGBART, K5VNIKIB, UIE2GTB in the last 72 hours. INR:   No results for input(s): INR in the last 72 hours. Lactate: No results for input(s): LACTATE in the last 72 hours. Cultures:  -----------------------------------------------------------------  RAD:   CTA ABDOMEN PELVIS W WO CONTRAST   Final Result      No sign of any aneurysm or dissection of the aorta or branches of the aorta      Incidental findings include fatty liver, borderline in size and moderate splenomegaly      Cholelithiasis with trace free fluid in the abdomen along left paracolic gutter of uncertain etiology      Scattered diverticulosis but no sign of any acute or active diverticulitis or obstruction      Bladder wall thickening appreciated. Fat-containing inguinal hernias noted bilaterally      CTA CHEST W WO CONTRAST   Final Result      No sign of any aneurysm or dissection of the aorta or branches of the aorta      Incidental findings include fatty liver, borderline in size and moderate splenomegaly      Cholelithiasis with trace free fluid in the abdomen along left paracolic gutter of uncertain etiology      Scattered diverticulosis but no sign of any acute or active diverticulitis or obstruction      Bladder wall thickening appreciated. Fat-containing inguinal hernias noted bilaterally      XR CHEST PORTABLE   Final Result      No acute radiographic abnormality. Assessment/Plan:   Kacie Arana is a 79 y.o. male with PMH of alcoholic liver cirrhosis, GERD, hyponatremia who presents to the ED from [address] [facility] facility with shortness of breath and chills. Sepsis rule out  COVID-19 positive   Patient came in to ED with tachycardia, tachypnea, CO2 15, LA 4.1. Responded well to 1L bolus. LA down trended to 1.8. WBC normal. CXR/CT chest/abd/pel unremarkable for infectious etiology except for bladder wall thickening.  UA w positive nitrite, large LE, growing E Coli  - Bcx x1 for ESBL sensitive to ertapenem  - Suspect sepsis could be 2/2 to UTI  - Abx: Merrem  - ID consult  --> de-escalated vanc, 10 days of ertapenem as outpatient  - Medically ready for dc    Acute decompensated alcoholic liver cirrhosis (MELD-Na of 25 - 14-15% 90 day mortality)  Hx of alcoholic cirrhosis per patient and per nurse at assisted living. Patient is jaundiced and has caput medusae, as well as fatty liver and splenomegaly on imaging. Unable to access [place] records at this time. Patient reports that he does not have a liver doctor or GI doctor, only a PCP. Current MELD is 25. Last drink 5 months ago per patient. - Continue home rifaximin  - Holding lactulose, patient doesn't take at home, complaining of diarrhea  - Continue home 5 mg midodrine Q8H for SBP <100  - GI consult --> follow up with Dr. Marcos Sotelo for EGD and outpatient follow up in 1 month    Hyponatremia - resolved  Na 126 on admission, trended up and steady around 130. Suspect etiology could be due to volume overload. - No intervention at this time    ? Pericardial effusion - resolved  Seen on POCUS echo in ED - mixed density concerned for clotted blood, though not seen on CTA chest  - No concern for tamponade physiology  - Cards consult - no intervention  - Complete echo w trivial effusion    Chronic Conditions  Alcoholic polyneuropathy  - Continue home gabapentin 300 mg BID     GERD  - Continue home protonix 40 mg daily     HTN  Mild hypotension on admission  - Hold home metoprolol succinate 12.5 mg daily, restart if pressures remain normal or elevated     BPH/Urinary retention  - Continue home tamsulosin 0.4 mg QHS    Code Status: Full Code  FEN: ADULT DIET; Regular; Low Sodium (2 gm)  ADULT ORAL NUTRITION SUPPLEMENT; Breakfast, Lunch, Dinner;  Low Calorie/High Protein Oral Supplement   PPX: Protonix  DISPO: Billi Riedel, MD, PGY-1  09/16/22  8:21 AM    This patient has been staffed and discussed with Obey Jordan MD.

## 2022-09-17 LAB — BLOOD CULTURE, ROUTINE: NORMAL

## 2022-09-19 ENCOUNTER — PHARMACY VISIT (OUTPATIENT)
Dept: INFECTIOUS DISEASES | Age: 67
End: 2022-09-19

## 2022-09-19 DIAGNOSIS — K70.30 ALCOHOLIC CIRRHOSIS OF LIVER WITHOUT ASCITES (HCC): Primary | ICD-10-CM

## 2022-09-19 DIAGNOSIS — B96.29 UTI DUE TO EXTENDED-SPECTRUM BETA LACTAMASE (ESBL) PRODUCING ESCHERICHIA COLI: ICD-10-CM

## 2022-09-19 DIAGNOSIS — N39.0 UTI DUE TO EXTENDED-SPECTRUM BETA LACTAMASE (ESBL) PRODUCING ESCHERICHIA COLI: ICD-10-CM

## 2022-09-19 DIAGNOSIS — Z16.12 UTI DUE TO EXTENDED-SPECTRUM BETA LACTAMASE (ESBL) PRODUCING ESCHERICHIA COLI: ICD-10-CM

## 2022-09-19 NOTE — PROGRESS NOTES
Dr. Bertram Alvarado has placed a referral order for pharmacist to manage Outpatient Parental Antimicrobial Therapy (OPAT) pursuant the ID Collaborative Practice Agreement. Patient and/or caregiver has verbally consented to have drug therapy managed by a pharmacist. The benefits and risks of complex antimicrobial therapy including drug-specific adverse reactions and necessary follow-up were discussed with patient and/or caregiver and they are amenable to OPAT and pharmacist management. Pertinent Objective Data:     Wt Readings from Last 1 Encounters:   09/16/22 216 lb 7.9 oz (98.2 kg)      BMI Readings from Last 1 Encounters:   09/16/22 31.97 kg/m²      Serum creatinine: 0.5 mg/dL   Estimated creatinine clearance: 166 mL/min    Lab Results   Component Value Date     09/16/2022    K 3.6 09/16/2022     09/16/2022    CO2 22 09/16/2022    BUN 5 (L) 09/16/2022    CREATININE <0.5 (L) 09/16/2022    GLUCOSE 112 (H) 09/16/2022    CALCIUM 8.0 (L) 09/16/2022    PROT 5.2 (L) 09/15/2022    LABALBU 2.3 (L) 09/15/2022    BILITOT 4.4 (H) 09/15/2022    ALKPHOS 343 (H) 09/15/2022    AST 89 (H) 09/15/2022    ALT 26 09/15/2022    LABGLOM >60 09/16/2022    GFRAA >60 09/16/2022    AGRATIO 0.9 (L) 09/10/2022       Lab Results   Component Value Date    WBC 3.0 (L) 09/16/2022    HGB 8.7 (L) 09/16/2022    HCT 25.6 (L) 09/16/2022    .2 (H) 09/16/2022    PLT 92 (L) 09/16/2022    LYMPHOPCT 24.5 09/16/2022    RBC 2.41 (L) 09/16/2022    MCH 35.9 (H) 09/16/2022    MCHC 33.8 09/16/2022    RDW 15.7 (H) 09/16/2022         OPAT Orders:  Organism/Diagnosis  9/10 blood + urine: ESBL Ecoli  9/13 blood: NGTD    ESBL ECOli bacteremia 2/2 acute cystitis likely 2/2 retention/unable to fully void    H/o cirrhosis with current Child Lock Class B   Antimicrobial Regimen and Projected End Date IV ertapenem 1gm q24hr- 9/23   Weekly Lab Monitoring CBCwdiff, CMP   Repeat Imaging Needed None   Follow up in ID Clinic None   LDA/OPAT Access L PICC [facility] [address] [facility] [phone]     Lab and medication orders have been placed. Clinical Pharmacist will review patient weekly or as needed and make recommendations regarding the above therapy plan.      Thank you,  Lorrie Saleh, PharmD, [address], [place] [facility]  Phone: [phone] (also available on [employer])  Fax: [phone]    For Pharmacy [address] [place] [address] in place:  Yes  Time Spent (min): 10

## 2022-09-20 LAB
ALBUMIN SERPL-MCNC: 2.3 G/DL
ALP BLD-CCNC: 218 U/L
ALT SERPL-CCNC: 18 U/L
ANION GAP SERPL CALCULATED.3IONS-SCNC: ABNORMAL MMOL/L
AST SERPL-CCNC: 55 U/L
BASOPHILS ABSOLUTE: ABNORMAL
BASOPHILS RELATIVE PERCENT: ABNORMAL
BILIRUB SERPL-MCNC: 4.1 MG/DL (ref 0.1–1.4)
BUN BLDV-MCNC: 6 MG/DL
CALCIUM SERPL-MCNC: ABNORMAL MG/DL
CHLORIDE BLD-SCNC: ABNORMAL MMOL/L
CO2: ABNORMAL
CREAT SERPL-MCNC: 0.5 MG/DL
EOSINOPHILS ABSOLUTE: ABNORMAL
EOSINOPHILS RELATIVE PERCENT: 3 %
GFR CALCULATED: ABNORMAL
GLUCOSE BLD-MCNC: 105 MG/DL
HCT VFR BLD CALC: 25 % (ref 41–53)
HEMOGLOBIN: 8.4 G/DL (ref 13.5–17.5)
LYMPHOCYTES ABSOLUTE: ABNORMAL
LYMPHOCYTES RELATIVE PERCENT: ABNORMAL
MCH RBC QN AUTO: ABNORMAL PG
MCHC RBC AUTO-ENTMCNC: ABNORMAL G/DL
MCV RBC AUTO: ABNORMAL FL
MONOCYTES ABSOLUTE: ABNORMAL
MONOCYTES RELATIVE PERCENT: ABNORMAL
NEUTROPHILS ABSOLUTE: ABNORMAL
NEUTROPHILS RELATIVE PERCENT: 61.5 %
PDW BLD-RTO: ABNORMAL %
PLATELET # BLD: 112 K/ΜL
PMV BLD AUTO: ABNORMAL FL
POTASSIUM SERPL-SCNC: 3.5 MMOL/L
RBC # BLD: ABNORMAL 10*6/UL
SODIUM BLD-SCNC: 135 MMOL/L
TOTAL PROTEIN: ABNORMAL
WBC # BLD: 2.8 10^3/ML

## 2022-09-21 DIAGNOSIS — Z16.12 UTI DUE TO EXTENDED-SPECTRUM BETA LACTAMASE (ESBL) PRODUCING ESCHERICHIA COLI: ICD-10-CM

## 2022-09-21 DIAGNOSIS — B96.29 UTI DUE TO EXTENDED-SPECTRUM BETA LACTAMASE (ESBL) PRODUCING ESCHERICHIA COLI: ICD-10-CM

## 2022-09-21 DIAGNOSIS — N39.0 UTI DUE TO EXTENDED-SPECTRUM BETA LACTAMASE (ESBL) PRODUCING ESCHERICHIA COLI: ICD-10-CM

## 2022-09-26 ENCOUNTER — TELEPHONE (OUTPATIENT)
Dept: INFECTIOUS DISEASES | Age: 67
End: 2022-09-26

## 2022-09-26 NOTE — TELEPHONE ENCOUNTER
Attempted to contact facility [facility] [phone] and DON mailbox full and cannot accept any messages    Pt was to end IV ATB on Friday 9/23  Wanted to speak to someone to verify this did happen

## 2022-09-27 NOTE — TELEPHONE ENCOUNTER
2nd Attempt to contact facility [address] and DON mailbox full and cannot accept any messages